# Patient Record
Sex: FEMALE | Race: WHITE | URBAN - METROPOLITAN AREA
[De-identification: names, ages, dates, MRNs, and addresses within clinical notes are randomized per-mention and may not be internally consistent; named-entity substitution may affect disease eponyms.]

---

## 2017-03-21 RX ORDER — TRAZODONE HYDROCHLORIDE 50 MG/1
TABLET ORAL
Qty: 90 TAB | Refills: 0 | Status: SHIPPED | OUTPATIENT
Start: 2017-03-21 | End: 2017-06-12 | Stop reason: SDUPTHER

## 2017-03-23 ENCOUNTER — HOSPITAL ENCOUNTER (OUTPATIENT)
Dept: LAB | Age: 57
Discharge: HOME | End: 2017-03-23
Payer: COMMERCIAL

## 2017-03-23 ENCOUNTER — OFFICE VISIT (OUTPATIENT)
Dept: FAMILY MEDICINE CLINIC | Facility: CLINIC | Age: 57
End: 2017-03-23

## 2017-03-23 VITALS
WEIGHT: 160 LBS | HEIGHT: 65 IN | OXYGEN SATURATION: 98 % | SYSTOLIC BLOOD PRESSURE: 110 MMHG | TEMPERATURE: 97.9 F | DIASTOLIC BLOOD PRESSURE: 80 MMHG | BODY MASS INDEX: 26.66 KG/M2 | RESPIRATION RATE: 15 BRPM | HEART RATE: 66 BPM

## 2017-03-23 DIAGNOSIS — L82.1 SEBORRHEIC KERATOSES: ICD-10-CM

## 2017-03-23 DIAGNOSIS — I10 ESSENTIAL HYPERTENSION: Primary | ICD-10-CM

## 2017-03-23 DIAGNOSIS — F32.A DEPRESSION, UNSPECIFIED DEPRESSION TYPE: ICD-10-CM

## 2017-03-23 DIAGNOSIS — I10 ESSENTIAL HYPERTENSION: ICD-10-CM

## 2017-03-23 DIAGNOSIS — Z13.9 SCREENING: ICD-10-CM

## 2017-03-23 DIAGNOSIS — J30.9 CHRONIC ALLERGIC RHINITIS: ICD-10-CM

## 2017-03-23 DIAGNOSIS — E78.5 HYPERLIPIDEMIA, UNSPECIFIED HYPERLIPIDEMIA TYPE: ICD-10-CM

## 2017-03-23 LAB
ALBUMIN SERPL BCP-MCNC: 4.3 G/DL (ref 3.4–5)
ALBUMIN/GLOB SERPL: 1.4 {RATIO} (ref 0.8–1.7)
ALP SERPL-CCNC: 72 U/L (ref 45–117)
ALT SERPL-CCNC: 38 U/L (ref 13–56)
ANION GAP BLD CALC-SCNC: 9 MMOL/L (ref 3–18)
APPEARANCE UR: CLEAR
AST SERPL W P-5'-P-CCNC: 28 U/L (ref 15–37)
BASOPHILS # BLD AUTO: 0 K/UL (ref 0–0.06)
BASOPHILS # BLD: 1 % (ref 0–2)
BILIRUB SERPL-MCNC: 0.6 MG/DL (ref 0.2–1)
BILIRUB UR QL: NEGATIVE
BUN SERPL-MCNC: 12 MG/DL (ref 7–18)
BUN/CREAT SERPL: 15 (ref 12–20)
CALCIUM SERPL-MCNC: 9.3 MG/DL (ref 8.5–10.1)
CHLORIDE SERPL-SCNC: 103 MMOL/L (ref 100–108)
CHOLEST SERPL-MCNC: 240 MG/DL
CO2 SERPL-SCNC: 29 MMOL/L (ref 21–32)
COLOR UR: YELLOW
CREAT SERPL-MCNC: 0.8 MG/DL (ref 0.6–1.3)
DIFFERENTIAL METHOD BLD: ABNORMAL
EOSINOPHIL # BLD: 0.2 K/UL (ref 0–0.4)
EOSINOPHIL NFR BLD: 2 % (ref 0–5)
ERYTHROCYTE [DISTWIDTH] IN BLOOD BY AUTOMATED COUNT: 12.5 % (ref 11.6–14.5)
GLOBULIN SER CALC-MCNC: 3 G/DL (ref 2–4)
GLUCOSE SERPL-MCNC: 86 MG/DL (ref 74–99)
GLUCOSE UR STRIP.AUTO-MCNC: NEGATIVE MG/DL
HCT VFR BLD AUTO: 44.7 % (ref 35–45)
HDLC SERPL-MCNC: 73 MG/DL (ref 40–60)
HDLC SERPL: 3.3 {RATIO} (ref 0–5)
HGB BLD-MCNC: 14.7 G/DL (ref 12–16)
HGB UR QL STRIP: NEGATIVE
KETONES UR QL STRIP.AUTO: NEGATIVE MG/DL
LDLC SERPL CALC-MCNC: 139.6 MG/DL (ref 0–100)
LEUKOCYTE ESTERASE UR QL STRIP.AUTO: NEGATIVE
LIPID PROFILE,FLP: ABNORMAL
LYMPHOCYTES # BLD AUTO: 40 % (ref 21–52)
LYMPHOCYTES # BLD: 2.8 K/UL (ref 0.9–3.6)
MCH RBC QN AUTO: 32.2 PG (ref 24–34)
MCHC RBC AUTO-ENTMCNC: 32.9 G/DL (ref 31–37)
MCV RBC AUTO: 97.8 FL (ref 74–97)
MONOCYTES # BLD: 0.6 K/UL (ref 0.05–1.2)
MONOCYTES NFR BLD AUTO: 9 % (ref 3–10)
NEUTS SEG # BLD: 3.3 K/UL (ref 1.8–8)
NEUTS SEG NFR BLD AUTO: 48 % (ref 40–73)
NITRITE UR QL STRIP.AUTO: NEGATIVE
PH UR STRIP: 7.5 [PH] (ref 5–8)
PLATELET # BLD AUTO: 224 K/UL (ref 135–420)
PMV BLD AUTO: 10.3 FL (ref 9.2–11.8)
POTASSIUM SERPL-SCNC: 4.2 MMOL/L (ref 3.5–5.5)
PROT SERPL-MCNC: 7.3 G/DL (ref 6.4–8.2)
PROT UR STRIP-MCNC: NEGATIVE MG/DL
RBC # BLD AUTO: 4.57 M/UL (ref 4.2–5.3)
SODIUM SERPL-SCNC: 141 MMOL/L (ref 136–145)
SP GR UR REFRACTOMETRY: 1.02 (ref 1–1.03)
T4 FREE SERPL-MCNC: 1.1 NG/DL (ref 0.7–1.5)
TRIGL SERPL-MCNC: 137 MG/DL (ref ?–150)
TSH SERPL DL<=0.05 MIU/L-ACNC: 1.58 UIU/ML (ref 0.36–3.74)
UROBILINOGEN UR QL STRIP.AUTO: 0.2 EU/DL (ref 0.2–1)
VLDLC SERPL CALC-MCNC: 27.4 MG/DL
WBC # BLD AUTO: 6.9 K/UL (ref 4.6–13.2)

## 2017-03-23 PROCEDURE — 36415 COLL VENOUS BLD VENIPUNCTURE: CPT | Performed by: FAMILY MEDICINE

## 2017-03-23 PROCEDURE — 84439 ASSAY OF FREE THYROXINE: CPT | Performed by: FAMILY MEDICINE

## 2017-03-23 PROCEDURE — 81003 URINALYSIS AUTO W/O SCOPE: CPT | Performed by: FAMILY MEDICINE

## 2017-03-23 PROCEDURE — 80061 LIPID PANEL: CPT | Performed by: FAMILY MEDICINE

## 2017-03-23 PROCEDURE — 80053 COMPREHEN METABOLIC PANEL: CPT | Performed by: FAMILY MEDICINE

## 2017-03-23 PROCEDURE — 85025 COMPLETE CBC W/AUTO DIFF WBC: CPT | Performed by: FAMILY MEDICINE

## 2017-03-23 PROCEDURE — 82306 VITAMIN D 25 HYDROXY: CPT | Performed by: FAMILY MEDICINE

## 2017-03-23 PROCEDURE — 84443 ASSAY THYROID STIM HORMONE: CPT | Performed by: FAMILY MEDICINE

## 2017-03-23 NOTE — PROGRESS NOTES
Nikolai Lechuga is a 55 y.o.  female presents today for office visit for follow up. Pt is in Room # 4      1. Have you been to the ER, urgent care clinic since your last visit? Hospitalized since your last visit? No    2. Have you seen or consulted any other health care providers outside of the [address] [place] [address] since your last visit? Include any pap smears or colon screening. No    No Patient Care Coordination Note on file.

## 2017-03-23 NOTE — PROGRESS NOTES
SUBJECTIVE  Chief Complaint   Patient presents with    Hypertension    Cholesterol Problem    Anxiety     HPI:     Her BP is being treated. Her depression/ anxiety is controlled. Congestion in both ears is controlled with Rhinocort w/o side effects. She is dieting for lipids. The skin lesion on Lt temple has not grown in the last 5-6 months. No pain or itching. Past Medical History:   Diagnosis Date    Acne     Breast nodule     right    Cervicalgia     Depression     Elevated LDL cholesterol level     Hypertension 1-1-2010    Multiple nevi     on skin    Uterine fibroid     with menorrhagia     Past Surgical History:   Procedure Laterality Date    HX TONSIL AND ADENOIDECTOMY         Current Outpatient Prescriptions   Medication Sig    traZODone (DESYREL) 50 mg tablet TAKE ONE-HALF (1/2) TO ONE TABLET AT BEDTIME    lisinopril-hydrochlorothiazide (PRINZIDE, ZESTORETIC) 20-12.5 mg per tablet TAKE 1 TABLET BY MOUTH EVERY DAY    budesonide (RHINOCORT AQUA) 32 mcg/actuation nasal spray INSTILL 2 SPRAYS INTO EACH NOSTRIL EVERY NIGHT AT BEDTIME    aspirin (ASPIRIN) 325 mg tablet Take 325 mg by mouth two (2) times daily as needed.  multivitamin (ONE A DAY) tablet Take 1 Tab by mouth daily. No current facility-administered medications for this visit. Allergies: No known allergy to drugs. ROS: Constitutional: No fever, dizziness. Ear/Nose/Throat: ear congestion as above; no earache, lesions, new speaking or hearing problems, nose bleeds. Cardiovascular: No angina, palpitations, PND, orthopnea, lightheadedness, edema, claudication. Respiratory: No dyspnea, wheeze, pleurisy, hemoptysis, unusual sputum. Gastrointestinal: No nausea/ vomiting, bowel habit change, pain, PAU symptoms, melena, hematochezia, anorexia. Psychiatric: No agitation, confusion/disorientation, suicidal or homicidal ideation.   Musculoskeletal: No focal weakness, stiffness/rigidity, radicular pain.  Skin: no other problems. OBJECTIVE  Constitutional: General Appearance:  well developed, well nourished, nontoxic, in no acute distress. Visit Vitals    /80 (BP 1 Location: Left arm, BP Patient Position: Sitting)    Pulse 66    Temp 97.9 °F (36.6 °C) (Oral)    Resp 15    Ht 5' 5\" (1.651 m)    Wt 160 lb (72.6 kg)    SpO2 98%    BMI 26.63 kg/m2     Otoscopic Examination: external auditory canals are clear; tympanic membranes are dull. Nasal Cavity: mildly swollen mucosa & turbinates. Maxillas are not tender w/o redness or heat. Throat: clear tonsils, oropharynx, posterior pharynx. Pulmonary: Respiratory effort: normal; no dyspnea, no retractions, no accessory muscle use. Auscultation: normal & symmetrical air exchange; no rales, no rhonchi, no wheeze; no rubs    Cardiovascular: Palpation: PMI not displaced or enlarged, no thrills or heaves. Auscultation: RRR; no murmur, rubs or gallops. Extremities: no edema, no active varicosity. Gastrointestinal: Normal bowel sounds. No masses; no tenderness; no rebound/rigidity; no CVA tenderness. No hepatosplenomegaly. Psychiatric: Oriented to time, place and person. Musculoskeletal[de-identified] Gait and Station: gait- normal; station- normal.  Head & Neck: NC/AT, neck is supple. Skin: 6 mm seborrheic keratosis on Lt temple (scaly, well-circumscribed lesion that have a \"stuck on\" appearance). She has few smaller ones on her neck area.       Lab Results   Component Value Date/Time    WBC 6.5 05/24/2016 09:33 AM    HGB 14.4 05/24/2016 09:33 AM    HCT 42.8 05/24/2016 09:33 AM    PLATELET 602 90/07/3011 09:33 AM    MCV 97.9 05/24/2016 09:33 AM     Lab Results   Component Value Date/Time    Cholesterol, total 254 09/30/2016 08:55 AM    HDL Cholesterol 72 09/30/2016 08:55 AM    LDL, calculated 136 09/30/2016 08:55 AM    VLDL, calculated 46 09/30/2016 08:55 AM    Triglyceride 230 09/30/2016 08:55 AM    CHOL/HDL Ratio 3.5 09/30/2016 08:55 AM Lab Results   Component Value Date/Time    Sodium 139 05/24/2016 09:33 AM    Potassium 3.6 05/24/2016 09:33 AM    Chloride 100 05/24/2016 09:33 AM    CO2 29 05/24/2016 09:33 AM    Anion gap 10 05/24/2016 09:33 AM    Glucose 96 05/24/2016 09:33 AM    BUN 9 05/24/2016 09:33 AM    Creatinine 0.81 05/24/2016 09:33 AM    BUN/Creatinine ratio 11 05/24/2016 09:33 AM    GFR est AA >60 05/24/2016 09:33 AM    GFR est non-AA >60 05/24/2016 09:33 AM    Calcium 9.3 05/24/2016 09:33 AM    Bilirubin, total 0.7 05/24/2016 09:33 AM    AST (SGOT) 25 05/24/2016 09:33 AM    Alk. phosphatase 62 05/24/2016 09:33 AM    Protein, total 7.3 05/24/2016 09:33 AM    Albumin 4.3 05/24/2016 09:33 AM    Globulin 3.0 05/24/2016 09:33 AM    A-G Ratio 1.4 05/24/2016 09:33 AM    ALT (SGPT) 31 05/24/2016 09:33 AM     Lab Results   Component Value Date/Time    TSH 1.50 05/24/2016 09:33 AM       ASSESSMENT    1. Essential hypertension    2. Depression, unspecified depression type    3. Hyperlipidemia, unspecified hyperlipidemia type    4. Screening    5. Seborrheic keratoses    6. Chronic allergic rhinitis        PLAN    Non-Pharmacologic Management: Discussed DDx, follow-up & work-up. Discussed risk/benefit & side effect of treatment. Allergy care. Skin care and f/u discussed. Follow up for regular OV, prn sooner. Health risk from non adherence discussed. Patient voiced understanding. Follow-up Disposition:  Return in about 3 months (around 6/23/2017).      Grabiel Galicia MD

## 2017-03-24 ENCOUNTER — TELEPHONE (OUTPATIENT)
Dept: FAMILY MEDICINE CLINIC | Facility: CLINIC | Age: 57
End: 2017-03-24

## 2017-03-24 LAB — 25(OH)D3 SERPL-MCNC: 36.1 NG/ML (ref 30–100)

## 2017-03-24 NOTE — TELEPHONE ENCOUNTER
LDL cholesterol is a little high; unchanged.  The patient is advised to continue with diet and exercise. Called pt and left message. Call back number left and I myself or one of the other nurses will attempt to contact again.     The call was to inform pt results

## 2017-03-24 NOTE — PROGRESS NOTES
LDL cholesterol is a little high; unchanged. The patient is advised to continue with diet and exercise.

## 2017-06-12 RX ORDER — TRAZODONE HYDROCHLORIDE 50 MG/1
TABLET ORAL
Qty: 90 TAB | Refills: 0 | Status: SHIPPED | OUTPATIENT
Start: 2017-06-12 | End: 2017-09-08 | Stop reason: SDUPTHER

## 2017-06-27 ENCOUNTER — OFFICE VISIT (OUTPATIENT)
Dept: FAMILY MEDICINE CLINIC | Facility: CLINIC | Age: 57
End: 2017-06-27

## 2017-06-27 VITALS
DIASTOLIC BLOOD PRESSURE: 84 MMHG | BODY MASS INDEX: 26.66 KG/M2 | RESPIRATION RATE: 16 BRPM | SYSTOLIC BLOOD PRESSURE: 124 MMHG | HEIGHT: 65 IN | WEIGHT: 160 LBS | HEART RATE: 88 BPM | TEMPERATURE: 98.5 F | OXYGEN SATURATION: 96 %

## 2017-06-27 DIAGNOSIS — I10 ESSENTIAL HYPERTENSION: Primary | ICD-10-CM

## 2017-06-27 DIAGNOSIS — F32.A DEPRESSION, UNSPECIFIED DEPRESSION TYPE: ICD-10-CM

## 2017-06-27 DIAGNOSIS — H69.83 EUSTACHIAN TUBE DYSFUNCTION, BILATERAL: ICD-10-CM

## 2017-06-27 DIAGNOSIS — E78.5 HYPERLIPIDEMIA, UNSPECIFIED HYPERLIPIDEMIA TYPE: ICD-10-CM

## 2017-06-27 NOTE — PROGRESS NOTES
SUBJECTIVE  Chief Complaint   Patient presents with    Hypertension     f/u     HPI:     Her BP is being treated. Her depression/ anxiety is controlled. Congestion in both ears is controlled with Rhinocort w/o side effects. She is dieting for lipids. The skin lesion on Lt temple has not grown in the last 5-6 months. No pain or itching. Past Medical History:   Diagnosis Date    Acne     Breast nodule     right    Cervicalgia     Depression     Elevated LDL cholesterol level     Hypertension 1-1-2010    Multiple nevi     on skin    Uterine fibroid     with menorrhagia     Past Surgical History:   Procedure Laterality Date    HX TONSIL AND ADENOIDECTOMY         Current Outpatient Prescriptions   Medication Sig    traZODone (DESYREL) 50 mg tablet TAKE ONE-HALF (1/2) TO ONE TABLET AT BEDTIME    lisinopril-hydrochlorothiazide (PRINZIDE, ZESTORETIC) 20-12.5 mg per tablet TAKE 1 TABLET BY MOUTH EVERY DAY    budesonide (RHINOCORT AQUA) 32 mcg/actuation nasal spray INSTILL 2 SPRAYS INTO EACH NOSTRIL EVERY NIGHT AT BEDTIME    aspirin (ASPIRIN) 325 mg tablet Take 325 mg by mouth two (2) times daily as needed.  multivitamin (ONE A DAY) tablet Take 1 Tab by mouth daily. No current facility-administered medications for this visit. Allergies: No known allergy to drugs. ROS:   Constitutional: No fever, dizziness. Ear/Nose/Throat: ear congestion as above; no earache, lesions, new speaking or hearing problems, nose bleeds. Cardiovascular: No angina, palpitations, PND, orthopnea, lightheadedness, edema, claudication. Respiratory: No dyspnea, wheeze, pleurisy, hemoptysis, unusual sputum. Gastrointestinal: No nausea/ vomiting, bowel habit change, pain, PAU symptoms, melena, hematochezia, anorexia. Psychiatric: No agitation, confusion/disorientation, suicidal or homicidal ideation. Musculoskeletal: No focal weakness, stiffness/rigidity, radicular pain.   Skin: no other problems. OBJECTIVE  Constitutional: General Appearance:  well developed, well nourished, nontoxic, in no acute distress. Visit Vitals    /84 (BP 1 Location: Left arm, BP Patient Position: Sitting)    Pulse 88    Temp 98.5 °F (36.9 °C) (Oral)    Resp 16    Ht 5' 5\" (1.651 m)    Wt 160 lb (72.6 kg)    SpO2 96%    BMI 26.63 kg/m2     Otoscopic Examination: external auditory canals are clear; tympanic membranes are dull. Nasal Cavity: mildly swollen mucosa & turbinates. Maxillas are not tender w/o redness or heat. Throat: clear tonsils, oropharynx, posterior pharynx. Pulmonary: Respiratory effort: normal; no dyspnea, no retractions, no accessory muscle use. Auscultation: normal & symmetrical air exchange; no rales, no rhonchi, no wheeze; no rubs    Cardiovascular: Palpation: PMI not displaced or enlarged, no thrills or heaves. Auscultation: RRR; no murmur, rubs or gallops. Extremities: no edema, no active varicosity. Gastrointestinal: Normal bowel sounds. No masses; no tenderness; no rebound/rigidity; no CVA tenderness. No hepatosplenomegaly. Psychiatric: Oriented to time, place and person. Musculoskeletal[de-identified] Gait and Station: gait- normal; station- normal.  Head & Neck: NC/AT, neck is supple.        Lab Results   Component Value Date/Time    WBC 6.9 03/23/2017 08:35 AM    HGB 14.7 03/23/2017 08:35 AM    HCT 44.7 03/23/2017 08:35 AM    PLATELET 305 74/31/8915 08:35 AM    MCV 97.8 03/23/2017 08:35 AM     Lab Results   Component Value Date/Time    Cholesterol, total 240 03/23/2017 08:35 AM    HDL Cholesterol 73 03/23/2017 08:35 AM    LDL, calculated 139.6 03/23/2017 08:35 AM    VLDL, calculated 27.4 03/23/2017 08:35 AM    Triglyceride 137 03/23/2017 08:35 AM    CHOL/HDL Ratio 3.3 03/23/2017 08:35 AM     Lab Results   Component Value Date/Time    Sodium 141 03/23/2017 08:35 AM    Potassium 4.2 03/23/2017 08:35 AM    Chloride 103 03/23/2017 08:35 AM    CO2 29 03/23/2017 08:35 AM Anion gap 9 03/23/2017 08:35 AM    Glucose 86 03/23/2017 08:35 AM    BUN 12 03/23/2017 08:35 AM    Creatinine 0.80 03/23/2017 08:35 AM    BUN/Creatinine ratio 15 03/23/2017 08:35 AM    GFR est AA >60 03/23/2017 08:35 AM    GFR est non-AA >60 03/23/2017 08:35 AM    Calcium 9.3 03/23/2017 08:35 AM    Bilirubin, total 0.6 03/23/2017 08:35 AM    AST (SGOT) 28 03/23/2017 08:35 AM    Alk. phosphatase 72 03/23/2017 08:35 AM    Protein, total 7.3 03/23/2017 08:35 AM    Albumin 4.3 03/23/2017 08:35 AM    Globulin 3.0 03/23/2017 08:35 AM    A-G Ratio 1.4 03/23/2017 08:35 AM    ALT (SGPT) 38 03/23/2017 08:35 AM     Lab Results   Component Value Date/Time    TSH 1.58 03/23/2017 08:35 AM     10 year CVD risk 2.3%. ASSESSMENT    1. Essential hypertension    2. Depression, unspecified depression type    3. Hyperlipidemia, unspecified hyperlipidemia type    4. Eustachian tube dysfunction, bilateral        PLAN    Non-Pharmacologic Management: Discussed DDx, follow-up & work-up. Discussed risk/benefit & side effect of treatment. Allergy care. Follow up for regular OV, prn sooner. Health risk from non adherence discussed. Patient voiced understanding. Follow-up Disposition:  Return in about 3 months (around 9/27/2017).      González Barnett MD

## 2017-06-27 NOTE — PROGRESS NOTES
1. Have you been to the ER, urgent care clinic or hospitalized since your last visit? NO.     2. Have you seen or consulted any other health care providers outside of the [address] [place] [address] since your last visit (Include any pap smears or colon screening)?  NO

## 2017-09-08 RX ORDER — TRAZODONE HYDROCHLORIDE 50 MG/1
TABLET ORAL
Qty: 90 TAB | Refills: 1 | Status: SHIPPED | OUTPATIENT
Start: 2017-09-08 | End: 2018-02-06 | Stop reason: SDUPTHER

## 2017-10-17 RX ORDER — LISINOPRIL AND HYDROCHLOROTHIAZIDE 12.5; 2 MG/1; MG/1
TABLET ORAL
Qty: 90 TAB | Refills: 3 | Status: SHIPPED | OUTPATIENT
Start: 2017-10-17 | End: 2018-05-10 | Stop reason: SDUPTHER

## 2017-10-19 ENCOUNTER — OFFICE VISIT (OUTPATIENT)
Dept: FAMILY MEDICINE CLINIC | Facility: CLINIC | Age: 57
End: 2017-10-19

## 2017-10-19 ENCOUNTER — HOSPITAL ENCOUNTER (OUTPATIENT)
Dept: LAB | Age: 57
Discharge: HOME | End: 2017-10-19
Payer: COMMERCIAL

## 2017-10-19 VITALS
WEIGHT: 159 LBS | HEIGHT: 65 IN | TEMPERATURE: 98.6 F | SYSTOLIC BLOOD PRESSURE: 120 MMHG | HEART RATE: 52 BPM | DIASTOLIC BLOOD PRESSURE: 80 MMHG | OXYGEN SATURATION: 100 % | BODY MASS INDEX: 26.49 KG/M2

## 2017-10-19 DIAGNOSIS — E78.5 HYPERLIPIDEMIA, UNSPECIFIED HYPERLIPIDEMIA TYPE: ICD-10-CM

## 2017-10-19 DIAGNOSIS — I10 ESSENTIAL HYPERTENSION: ICD-10-CM

## 2017-10-19 DIAGNOSIS — F32.A DEPRESSION, UNSPECIFIED DEPRESSION TYPE: ICD-10-CM

## 2017-10-19 DIAGNOSIS — G44.219 EPISODIC TENSION-TYPE HEADACHE, NOT INTRACTABLE: ICD-10-CM

## 2017-10-19 DIAGNOSIS — I10 ESSENTIAL HYPERTENSION: Primary | ICD-10-CM

## 2017-10-19 LAB
ANION GAP SERPL CALC-SCNC: 8 MMOL/L (ref 3–18)
BUN SERPL-MCNC: 14 MG/DL (ref 7–18)
BUN/CREAT SERPL: 19 (ref 12–20)
CALCIUM SERPL-MCNC: 9.6 MG/DL (ref 8.5–10.1)
CHLORIDE SERPL-SCNC: 103 MMOL/L (ref 100–108)
CHOLEST SERPL-MCNC: 232 MG/DL
CO2 SERPL-SCNC: 29 MMOL/L (ref 21–32)
CREAT SERPL-MCNC: 0.75 MG/DL (ref 0.6–1.3)
GLUCOSE SERPL-MCNC: 87 MG/DL (ref 74–99)
HDLC SERPL-MCNC: 63 MG/DL (ref 40–60)
HDLC SERPL: 3.7 {RATIO} (ref 0–5)
LDLC SERPL CALC-MCNC: 132.8 MG/DL (ref 0–100)
LIPID PROFILE,FLP: ABNORMAL
POTASSIUM SERPL-SCNC: 4 MMOL/L (ref 3.5–5.5)
SODIUM SERPL-SCNC: 140 MMOL/L (ref 136–145)
TRIGL SERPL-MCNC: 181 MG/DL (ref ?–150)
VLDLC SERPL CALC-MCNC: 36.2 MG/DL

## 2017-10-19 PROCEDURE — 80048 BASIC METABOLIC PNL TOTAL CA: CPT | Performed by: FAMILY MEDICINE

## 2017-10-19 PROCEDURE — 36415 COLL VENOUS BLD VENIPUNCTURE: CPT | Performed by: FAMILY MEDICINE

## 2017-10-19 PROCEDURE — 80061 LIPID PANEL: CPT | Performed by: FAMILY MEDICINE

## 2017-10-19 NOTE — PROGRESS NOTES
Jamal Newberry is a 55 y.o.  female presents today for office visit for follow up. Pt is in Room # 4      1. Have you been to the ER, urgent care clinic since your last visit? Hospitalized since your last visit? No    2. Have you seen or consulted any other health care providers outside of the [address] [place] [address] since your last visit? Include any pap smears or colon screening. No    Health Maintenance reviewed - patient received flu vx at [facility].

## 2017-10-19 NOTE — PROGRESS NOTES
SUBJECTIVE  Chief Complaint   Patient presents with    Hypertension    Cholesterol Problem    Depression     HPI:     Her BP is being treated and controlled. Her depression/ anxiety is controlled. Congestion in both ears is controlled with Rhinocort w/o side effects. She is dieting for lipids. She has had HA for one week after hitting the back of her head on furniture 2 weeks ago. No LOC or confusion. No focal signs. No N&V. The skin lesion on Lt temple has not grown in the last 5-6 months. No pain or itching. Past Medical History:   Diagnosis Date    Acne     Breast nodule     right    Cervicalgia     Depression     Elevated LDL cholesterol level     Hypertension 1-1-2010    Multiple nevi     on skin    Uterine fibroid     with menorrhagia     Past Surgical History:   Procedure Laterality Date    HX TONSIL AND ADENOIDECTOMY         Current Outpatient Prescriptions   Medication Sig    lisinopril-hydroCHLOROthiazide (PRINZIDE, ZESTORETIC) 20-12.5 mg per tablet TAKE 1 TABLET DAILY    traZODone (DESYREL) 50 mg tablet TAKE ONE-HALF (1/2) TO ONE TABLET AT BEDTIME    budesonide (RHINOCORT AQUA) 32 mcg/actuation nasal spray INSTILL 2 SPRAYS INTO EACH NOSTRIL EVERY NIGHT AT BEDTIME    aspirin (ASPIRIN) 325 mg tablet Take 325 mg by mouth two (2) times daily as needed.  multivitamin (ONE A DAY) tablet Take 1 Tab by mouth daily. No current facility-administered medications for this visit. Allergies: No known allergy to drugs. ROS:   Constitutional: No fever, chills, night sweats, malaise, dizziness. Ear/Nose/Throat: ear congestion as above; no earache, lesions, new speaking or hearing problems, nose bleeds. Cardiovascular: No angina, palpitations, PND, orthopnea, lightheadedness, edema, claudication. Respiratory: No dyspnea, wheeze, pleurisy, hemoptysis, unusual sputum.     Gastrointestinal: No nausea/ vomiting, bowel habit change, pain, PAU symptoms, melena, hematochezia, anorexia. Psychiatric: No agitation, confusion/disorientation, suicidal or homicidal ideation. Musculoskeletal: No focal weakness, stiffness/rigidity, radicular pain. Skin: no other problems. OBJECTIVE  Constitutional: General Appearance:  well developed, well nourished, nontoxic, in no acute distress. Visit Vitals    /80 (BP 1 Location: Left arm, BP Patient Position: Sitting)    Pulse (!) 52    Temp 98.6 °F (37 °C) (Oral)    Ht 5' 5\" (1.651 m)    Wt 159 lb (72.1 kg)    SpO2 100%    BMI 26.46 kg/m2     Eyes[de-identified] Conjunctiva: normal & Lids: normal.  Pupils & Irises: normal size; equal, round and reactive to light. Otoscopic Examination: external auditory canals are clear; tympanic membranes are dull. Nasal Cavity: mildly swollen mucosa & turbinates. Maxillas are not tender w/o redness or heat. Throat: clear tonsils, oropharynx, posterior pharynx. Pulmonary: Respiratory effort: normal; no dyspnea, no retractions, no accessory muscle use. Auscultation: normal & symmetrical air exchange; no rales, no rhonchi, no wheeze; no rubs    Cardiovascular: Palpation: PMI not displaced or enlarged, no thrills or heaves. Auscultation: RRR; no murmur, rubs or gallops. Extremities: no edema, no active varicosity. Gastrointestinal: Normal bowel sounds. No masses; no tenderness; no rebound/rigidity; no CVA tenderness. No hepatosplenomegaly. Psychiatric: Oriented to time, place and person. Musculoskeletal[de-identified]   Gait and Station: gait- normal; station- normal.  Head & Neck: NC/AT, neck is supple. Neck: good ROM. Non tender. All Ext:  Normal strength & tone. Neurological[de-identified] CN I to XII: intact.   DTR: symmetrical & normal.  Balance- normal. Sensory- normal.      Lab Results   Component Value Date/Time    WBC 6.9 03/23/2017 08:35 AM    HGB 14.7 03/23/2017 08:35 AM    HCT 44.7 03/23/2017 08:35 AM    PLATELET 866 38/43/8490 08:35 AM    MCV 97.8 03/23/2017 08:35 AM     Lab Results   Component Value Date/Time    Cholesterol, total 240 03/23/2017 08:35 AM    HDL Cholesterol 73 03/23/2017 08:35 AM    LDL, calculated 139.6 03/23/2017 08:35 AM    VLDL, calculated 27.4 03/23/2017 08:35 AM    Triglyceride 137 03/23/2017 08:35 AM    CHOL/HDL Ratio 3.3 03/23/2017 08:35 AM     Lab Results   Component Value Date/Time    Sodium 141 03/23/2017 08:35 AM    Potassium 4.2 03/23/2017 08:35 AM    Chloride 103 03/23/2017 08:35 AM    CO2 29 03/23/2017 08:35 AM    Anion gap 9 03/23/2017 08:35 AM    Glucose 86 03/23/2017 08:35 AM    BUN 12 03/23/2017 08:35 AM    Creatinine 0.80 03/23/2017 08:35 AM    BUN/Creatinine ratio 15 03/23/2017 08:35 AM    GFR est AA >60 03/23/2017 08:35 AM    GFR est non-AA >60 03/23/2017 08:35 AM    Calcium 9.3 03/23/2017 08:35 AM    Bilirubin, total 0.6 03/23/2017 08:35 AM    AST (SGOT) 28 03/23/2017 08:35 AM    Alk. phosphatase 72 03/23/2017 08:35 AM    Protein, total 7.3 03/23/2017 08:35 AM    Albumin 4.3 03/23/2017 08:35 AM    Globulin 3.0 03/23/2017 08:35 AM    A-G Ratio 1.4 03/23/2017 08:35 AM    ALT (SGPT) 38 03/23/2017 08:35 AM     Lab Results   Component Value Date/Time    TSH 1.58 03/23/2017 08:35 AM     10 year CVD risk 2.3%. ASSESSMENT    1. Essential hypertension    2. Depression, unspecified depression type    3. Hyperlipidemia, unspecified hyperlipidemia type    4. Episodic tension-type headache, not intractable, resolved        PLAN    Orders Placed This Encounter    LIPID PANEL    METABOLIC PANEL, BASIC    HEMOGLOBIN A1C WITH EAG     Discussed FU of head injury. Discussed DDx, follow-up & work-up. Discussed risk/benefit & side effect of treatment. Allergy care. Follow up for regular OV, prn sooner. Health risk from non adherence discussed. Patient voiced understanding. Follow-up Disposition:  Return in about 3 months (around 1/19/2018).      Tia Grissom MD

## 2017-10-20 ENCOUNTER — TELEPHONE (OUTPATIENT)
Dept: FAMILY MEDICINE CLINIC | Facility: CLINIC | Age: 57
End: 2017-10-20

## 2017-10-20 NOTE — PROGRESS NOTES
Triglyceride was high again. Lipid profile is about same as last 18 months. The patient is advised to continue with diet and exercise. BMP was good.

## 2017-10-20 NOTE — TELEPHONE ENCOUNTER
Spoke with pt in regards to lab results. Two pt identifier's and permission to release verified. Relayed 's notes. Pt acknowledges understanding and states will go to [facility] to have A1C performed. Pt voices no concerns at this time.

## 2018-02-06 ENCOUNTER — OFFICE VISIT (OUTPATIENT)
Dept: FAMILY MEDICINE CLINIC | Age: 58
End: 2018-02-06

## 2018-02-06 VITALS
HEIGHT: 65 IN | BODY MASS INDEX: 26.66 KG/M2 | TEMPERATURE: 98.6 F | HEART RATE: 56 BPM | SYSTOLIC BLOOD PRESSURE: 128 MMHG | WEIGHT: 160 LBS | OXYGEN SATURATION: 97 % | DIASTOLIC BLOOD PRESSURE: 84 MMHG | RESPIRATION RATE: 18 BRPM

## 2018-02-06 DIAGNOSIS — I10 ESSENTIAL HYPERTENSION: Primary | ICD-10-CM

## 2018-02-06 DIAGNOSIS — F32.A DEPRESSION, UNSPECIFIED DEPRESSION TYPE: ICD-10-CM

## 2018-02-06 DIAGNOSIS — E78.5 HYPERLIPIDEMIA, UNSPECIFIED HYPERLIPIDEMIA TYPE: ICD-10-CM

## 2018-02-06 DIAGNOSIS — E66.3 OVERWEIGHT (BMI 25.0-29.9): ICD-10-CM

## 2018-02-06 DIAGNOSIS — S60.221A TRAUMATIC HEMATOMA OF RIGHT HAND, INITIAL ENCOUNTER: ICD-10-CM

## 2018-02-06 RX ORDER — TRAZODONE HYDROCHLORIDE 50 MG/1
TABLET ORAL
Qty: 90 TAB | Refills: 3 | Status: SHIPPED | OUTPATIENT
Start: 2018-02-06 | End: 2018-12-13 | Stop reason: SDUPTHER

## 2018-02-06 NOTE — PROGRESS NOTES
Gilmar Galdamez is a 65 y.o. female presents in office to f/u. There are no preventive care reminders to display for this patient. 1. Have you been to the ER, urgent care clinic since your last visit? Hospitalized since your last visit? No    2. Have you seen or consulted any other health care providers outside of the [address] [place] [address] since your last visit? Include any pap smears or colon screening.  No

## 2018-02-06 NOTE — PROGRESS NOTES
SUBJECTIVE:    Chief Complaint   Patient presents with    Hypertension     f/u    Cholesterol Problem     f/u    Depression     f/u    Hand Injury     HPI:     Her BP is being treated and controlled. Her depression/ anxiety is controlled. Congestion in both ears is controlled with Rhinocort w/o side effects. She is dieting for lipids and weight. She has had pain in her Rt hand since banged hand into fireplace one week and a half ago and still has a tender bump. Bruising is fading. Past Medical History:   Diagnosis Date    Acne     Breast nodule     right    Cervicalgia     Depression     Elevated LDL cholesterol level     Hypertension 1-1-2010    Multiple nevi     on skin    Uterine fibroid     with menorrhagia     Past Surgical History:   Procedure Laterality Date    HX TONSIL AND ADENOIDECTOMY         Current Outpatient Prescriptions   Medication Sig    traZODone (DESYREL) 50 mg tablet TAKE ONE TABLET AT BEDTIME    lisinopril-hydroCHLOROthiazide (PRINZIDE, ZESTORETIC) 20-12.5 mg per tablet TAKE 1 TABLET DAILY    budesonide (RHINOCORT AQUA) 32 mcg/actuation nasal spray INSTILL 2 SPRAYS INTO EACH NOSTRIL EVERY NIGHT AT BEDTIME    aspirin (ASPIRIN) 325 mg tablet Take 325 mg by mouth two (2) times daily as needed.  multivitamin (ONE A DAY) tablet Take 1 Tab by mouth daily. No current facility-administered medications for this visit. Allergies: No known allergy to drugs. ROS:   Constitutional: No fever, chills, night sweats, malaise, dizziness. Ear/Nose/Throat: ear congestion as above; no earache, lesions, new speaking or hearing problems, nose bleeds. Cardiovascular: No angina, palpitations, PND, orthopnea, lightheadedness, edema, claudication. Respiratory: No dyspnea, wheeze, pleurisy, hemoptysis, unusual sputum. Gastrointestinal: No nausea/ vomiting, bowel habit change, pain, PAU symptoms, melena, hematochezia, anorexia.     Psychiatric: No agitation, confusion/disorientation, suicidal or homicidal ideation. Musculoskeletal: No focal weakness, stiffness/rigidity, radicular pain. Skin: no other problems. OBJECTIVE  Constitutional: General Appearance:  well developed, well nourished, nontoxic, in no acute distress. Visit Vitals    /84 (BP 1 Location: Left arm, BP Patient Position: Sitting)    Pulse (!) 56    Temp 98.6 °F (37 °C) (Oral)    Resp 18    Ht 5' 5\" (1.651 m)    Wt 160 lb (72.6 kg)    SpO2 97%    BMI 26.63 kg/m2     Eyes[de-identified] Conjunctiva: normal & Lids: normal.  Pupils & Irises: normal size; equal, round and reactive to light. Otoscopic Examination: external auditory canals are clear; tympanic membranes are dull. Nasal Cavity: mildly swollen mucosa & turbinates. Maxillas are not tender w/o redness or heat. Throat: clear tonsils, oropharynx, posterior pharynx. Pulmonary: Respiratory effort: normal; no dyspnea, no retractions, no accessory muscle use. Auscultation: normal & symmetrical air exchange; no rales, no rhonchi, no wheeze; no rubs    Cardiovascular: Palpation: PMI not displaced or enlarged, no thrills or heaves. Auscultation: RRR; no murmur, rubs or gallops. Extremities: no edema, no active varicosity. Gastrointestinal: Normal bowel sounds. No masses; no tenderness; no rebound/rigidity; no CVA tenderness. No hepatosplenomegaly. Psychiatric: Oriented to time, place and person. Musculoskeletal[de-identified]   All Ext:  Normal strength & tone. Rt hand: there is a <1 cm hematoma on back on Rt hand with fading ecchymosis. Normal function. No deformity. Good ROM WO pain. Neurological[de-identified] CN I to XII: intact.   DTR: symmetrical & normal.      Lab Results   Component Value Date/Time    WBC 6.9 03/23/2017 08:35 AM    HGB 14.7 03/23/2017 08:35 AM    HCT 44.7 03/23/2017 08:35 AM    PLATELET 573 40/76/7382 08:35 AM    MCV 97.8 03/23/2017 08:35 AM     Lab Results   Component Value Date/Time    Cholesterol, total 232 10/19/2017 08:14 AM    HDL Cholesterol 63 10/19/2017 08:14 AM    LDL, calculated 132.8 10/19/2017 08:14 AM    VLDL, calculated 36.2 10/19/2017 08:14 AM    Triglyceride 181 10/19/2017 08:14 AM    CHOL/HDL Ratio 3.7 10/19/2017 08:14 AM     Lab Results   Component Value Date/Time    Sodium 140 10/19/2017 08:14 AM    Potassium 4.0 10/19/2017 08:14 AM    Chloride 103 10/19/2017 08:14 AM    CO2 29 10/19/2017 08:14 AM    Anion gap 8 10/19/2017 08:14 AM    Glucose 87 10/19/2017 08:14 AM    BUN 14 10/19/2017 08:14 AM    Creatinine 0.75 10/19/2017 08:14 AM    BUN/Creatinine ratio 19 10/19/2017 08:14 AM    GFR est AA >60 10/19/2017 08:14 AM    GFR est non-AA >60 10/19/2017 08:14 AM    Calcium 9.6 10/19/2017 08:14 AM    Bilirubin, total 0.6 03/23/2017 08:35 AM    AST (SGOT) 28 03/23/2017 08:35 AM    Alk. phosphatase 72 03/23/2017 08:35 AM    Protein, total 7.3 03/23/2017 08:35 AM    Albumin 4.3 03/23/2017 08:35 AM    Globulin 3.0 03/23/2017 08:35 AM    A-G Ratio 1.4 03/23/2017 08:35 AM    ALT (SGPT) 38 03/23/2017 08:35 AM     Lab Results   Component Value Date/Time    TSH 1.58 03/23/2017 08:35 AM     10 year CVD risk @2.5%. ASSESSMENT    1. Essential hypertension    2. Hyperlipidemia, unspecified hyperlipidemia type    3. Depression, unspecified depression type    4. Traumatic hematoma of right hand, initial encounter    5. Overweight (BMI 25.0-29. 9)        PLAN    Orders Placed This Encounter    traZODone (DESYREL) 50 mg tablet     Discussed the patient's BMI with her. The BMI follow up plan is as follows:   -dietary management education, guidance, and counseling  -encourage exercise  -monitor weight prescribed dietary intake  -reviewed instructions. Gentle ROM exercises, moist heat and posture care. Discussed DDx, follow-up & work-up. Discussed risk/benefit & side effect of treatment. Lipid lowering low salt diet, weight loss and graduated exercise. Allergy care. Follow up for regular OV, prn sooner.   Health risk from non adherence discussed. Patient voiced understanding. Follow-up Disposition:  Return in about 3 months (around 5/6/2018).      Jorge Bradshaw MD

## 2018-05-10 ENCOUNTER — OFFICE VISIT (OUTPATIENT)
Dept: FAMILY MEDICINE CLINIC | Age: 58
End: 2018-05-10

## 2018-05-10 ENCOUNTER — HOSPITAL ENCOUNTER (OUTPATIENT)
Dept: LAB | Age: 58
Discharge: HOME | End: 2018-05-10
Payer: COMMERCIAL

## 2018-05-10 VITALS
HEART RATE: 56 BPM | WEIGHT: 156.4 LBS | BODY MASS INDEX: 26.06 KG/M2 | SYSTOLIC BLOOD PRESSURE: 110 MMHG | DIASTOLIC BLOOD PRESSURE: 80 MMHG | TEMPERATURE: 98 F | HEIGHT: 65 IN | RESPIRATION RATE: 18 BRPM | OXYGEN SATURATION: 98 %

## 2018-05-10 DIAGNOSIS — I10 ESSENTIAL HYPERTENSION: ICD-10-CM

## 2018-05-10 DIAGNOSIS — F33.9 RECURRENT DEPRESSION (HCC): ICD-10-CM

## 2018-05-10 DIAGNOSIS — E78.5 HYPERLIPIDEMIA, UNSPECIFIED HYPERLIPIDEMIA TYPE: ICD-10-CM

## 2018-05-10 DIAGNOSIS — E66.3 OVERWEIGHT (BMI 25.0-29.9): ICD-10-CM

## 2018-05-10 DIAGNOSIS — I10 ESSENTIAL HYPERTENSION: Primary | ICD-10-CM

## 2018-05-10 DIAGNOSIS — M72.2 PLANTAR FASCIITIS OF RIGHT FOOT: ICD-10-CM

## 2018-05-10 LAB
ALBUMIN SERPL-MCNC: 4.3 G/DL (ref 3.4–5)
ALBUMIN/GLOB SERPL: 1.5 {RATIO} (ref 0.8–1.7)
ALP SERPL-CCNC: 64 U/L (ref 45–117)
ALT SERPL-CCNC: 33 U/L (ref 13–56)
ANION GAP SERPL CALC-SCNC: 10 MMOL/L (ref 3–18)
APPEARANCE UR: CLEAR
AST SERPL-CCNC: 23 U/L (ref 15–37)
BASOPHILS # BLD: 0.1 K/UL (ref 0–0.06)
BASOPHILS NFR BLD: 1 % (ref 0–2)
BILIRUB SERPL-MCNC: 0.5 MG/DL (ref 0.2–1)
BILIRUB UR QL: NEGATIVE
BUN SERPL-MCNC: 11 MG/DL (ref 7–18)
BUN/CREAT SERPL: 13 (ref 12–20)
CALCIUM SERPL-MCNC: 9 MG/DL (ref 8.5–10.1)
CHLORIDE SERPL-SCNC: 104 MMOL/L (ref 100–108)
CHOLEST SERPL-MCNC: 201 MG/DL
CO2 SERPL-SCNC: 25 MMOL/L (ref 21–32)
COLOR UR: YELLOW
CREAT SERPL-MCNC: 0.82 MG/DL (ref 0.6–1.3)
DIFFERENTIAL METHOD BLD: ABNORMAL
EOSINOPHIL # BLD: 0.1 K/UL (ref 0–0.4)
EOSINOPHIL NFR BLD: 2 % (ref 0–5)
ERYTHROCYTE [DISTWIDTH] IN BLOOD BY AUTOMATED COUNT: 12 % (ref 11.6–14.5)
GLOBULIN SER CALC-MCNC: 2.9 G/DL (ref 2–4)
GLUCOSE SERPL-MCNC: 86 MG/DL (ref 74–99)
GLUCOSE UR STRIP.AUTO-MCNC: NEGATIVE MG/DL
HCT VFR BLD AUTO: 44.8 % (ref 35–45)
HDLC SERPL-MCNC: 51 MG/DL (ref 40–60)
HDLC SERPL: 3.9 {RATIO} (ref 0–5)
HGB BLD-MCNC: 14.9 G/DL (ref 12–16)
HGB UR QL STRIP: NEGATIVE
KETONES UR QL STRIP.AUTO: NEGATIVE MG/DL
LDLC SERPL CALC-MCNC: 124.8 MG/DL (ref 0–100)
LEUKOCYTE ESTERASE UR QL STRIP.AUTO: NEGATIVE
LIPID PROFILE,FLP: ABNORMAL
LYMPHOCYTES # BLD: 2.4 K/UL (ref 0.9–3.6)
LYMPHOCYTES NFR BLD: 37 % (ref 21–52)
MCH RBC QN AUTO: 32.5 PG (ref 24–34)
MCHC RBC AUTO-ENTMCNC: 33.3 G/DL (ref 31–37)
MCV RBC AUTO: 97.6 FL (ref 74–97)
MONOCYTES # BLD: 0.5 K/UL (ref 0.05–1.2)
MONOCYTES NFR BLD: 8 % (ref 3–10)
NEUTS SEG # BLD: 3.5 K/UL (ref 1.8–8)
NEUTS SEG NFR BLD: 52 % (ref 40–73)
NITRITE UR QL STRIP.AUTO: NEGATIVE
PH UR STRIP: 8 [PH] (ref 5–8)
PLATELET # BLD AUTO: 267 K/UL (ref 135–420)
PMV BLD AUTO: 10.6 FL (ref 9.2–11.8)
POTASSIUM SERPL-SCNC: 4.1 MMOL/L (ref 3.5–5.5)
PROT SERPL-MCNC: 7.2 G/DL (ref 6.4–8.2)
PROT UR STRIP-MCNC: NEGATIVE MG/DL
RBC # BLD AUTO: 4.59 M/UL (ref 4.2–5.3)
SODIUM SERPL-SCNC: 139 MMOL/L (ref 136–145)
SP GR UR REFRACTOMETRY: 1.01 (ref 1–1.03)
TRIGL SERPL-MCNC: 126 MG/DL (ref ?–150)
TSH SERPL DL<=0.05 MIU/L-ACNC: 1.16 UIU/ML (ref 0.36–3.74)
UROBILINOGEN UR QL STRIP.AUTO: 0.2 EU/DL (ref 0.2–1)
VLDLC SERPL CALC-MCNC: 25.2 MG/DL
WBC # BLD AUTO: 6.7 K/UL (ref 4.6–13.2)

## 2018-05-10 PROCEDURE — 81003 URINALYSIS AUTO W/O SCOPE: CPT | Performed by: FAMILY MEDICINE

## 2018-05-10 PROCEDURE — 84443 ASSAY THYROID STIM HORMONE: CPT | Performed by: FAMILY MEDICINE

## 2018-05-10 PROCEDURE — 80061 LIPID PANEL: CPT | Performed by: FAMILY MEDICINE

## 2018-05-10 PROCEDURE — 80053 COMPREHEN METABOLIC PANEL: CPT | Performed by: FAMILY MEDICINE

## 2018-05-10 PROCEDURE — 36415 COLL VENOUS BLD VENIPUNCTURE: CPT | Performed by: FAMILY MEDICINE

## 2018-05-10 PROCEDURE — 85025 COMPLETE CBC W/AUTO DIFF WBC: CPT | Performed by: FAMILY MEDICINE

## 2018-05-10 RX ORDER — LISINOPRIL AND HYDROCHLOROTHIAZIDE 12.5; 2 MG/1; MG/1
TABLET ORAL
Qty: 45 TAB | Refills: 3
Start: 2018-05-10 | End: 2018-10-10 | Stop reason: SDUPTHER

## 2018-05-10 NOTE — PATIENT INSTRUCTIONS
Body Mass Index: Care Instructions  Your Care Instructions    Body mass index (BMI) can help you see if your weight is raising your risk for health problems. It uses a formula to compare how much you weigh with how tall you are. · A BMI lower than 18.5 is considered underweight. · A BMI between 18.5 and 24.9 is considered healthy. · A BMI between 25 and 29.9 is considered overweight. A BMI of 30 or higher is considered obese. If your BMI is in the normal range, it means that you have a lower risk for weight-related health problems. If your BMI is in the overweight or obese range, you may be at increased risk for weight-related health problems, such as high blood pressure, heart disease, stroke, arthritis or joint pain, and diabetes. If your BMI is in the underweight range, you may be at increased risk for health problems such as fatigue, lower protection (immunity) against illness, muscle loss, bone loss, hair loss, and hormone problems. BMI is just one measure of your risk for weight-related health problems. You may be at higher risk for health problems if you are not active, you eat an unhealthy diet, or you drink too much alcohol or use tobacco products. Follow-up care is a key part of your treatment and safety. Be sure to make and go to all appointments, and call your doctor if you are having problems. It's also a good idea to know your test results and keep a list of the medicines you take. How can you care for yourself at home? · Practice healthy eating habits. This includes eating plenty of fruits, vegetables, whole grains, lean protein, and low-fat dairy. · If your doctor recommends it, get more exercise. Walking is a good choice. Bit by bit, increase the amount you walk every day. Try for at least 30 minutes on most days of the week. · Do not smoke. Smoking can increase your risk for health problems. If you need help quitting, talk to your doctor about stop-smoking programs and medicines. These can increase your chances of quitting for good. · Limit alcohol to 2 drinks a day for men and 1 drink a day for women. Too much alcohol can cause health problems. If you have a BMI higher than 25  · Your doctor may do other tests to check your risk for weight-related health problems. This may include measuring the distance around your waist. A waist measurement of more than 40 inches in men or 35 inches in women can increase the risk of weight-related health problems. · Talk with your doctor about steps you can take to stay healthy or improve your health. You may need to make lifestyle changes to lose weight and stay healthy, such as changing your diet and getting regular exercise. If you have a BMI lower than 18.5  · Your doctor may do other tests to check your risk for health problems. · Talk with your doctor about steps you can take to stay healthy or improve your health. You may need to make lifestyle changes to gain or maintain weight and stay healthy, such as getting more healthy foods in your diet and doing exercises to build muscle. Where can you learn more? Go to http://tee-sergio.info/. Enter S176 in the search box to learn more about \"Body Mass Index: Care Instructions. \"  Current as of: October 13, 2016  Content Version: 11.4  © 5231-8723 [employer]. Care instructions adapted under license by [employer] (which disclaims liability or warranty for this information). If you have questions about a medical condition or this instruction, always ask your healthcare professional. [place] [address] any warranty or liability for your use of this information. Starting a Weight Loss Plan: Care Instructions  Your Care Instructions    If you are thinking about losing weight, it can be hard to know where to start. Your doctor can help you set up a weight loss plan that best meets your needs.  You may want to take a class on nutrition or exercise, or join a weight loss support group. If you have questions about how to make changes to your eating or exercise habits, ask your doctor about seeing a registered dietitian or an exercise specialist.  It can be a big challenge to lose weight. But you do not have to make huge changes at once. Make small changes, and stick with them. When those changes become habit, add a few more changes. If you do not think you are ready to make changes right now, try to pick a date in the future. Make an appointment to see your doctor to discuss whether the time is right for you to start a plan. Follow-up care is a key part of your treatment and safety. Be sure to make and go to all appointments, and call your doctor if you are having problems. It's also a good idea to know your test results and keep a list of the medicines you take. How can you care for yourself at home? · Set realistic goals. Many people expect to lose much more weight than is likely. A weight loss of 5% to 10% of your body weight may be enough to improve your health. · Get family and friends involved to provide support. Talk to them about why you are trying to lose weight, and ask them to help. They can help by participating in exercise and having meals with you, even if they may be eating something different. · Find what works best for you. If you do not have time or do not like to cook, a program that offers meal replacement bars or shakes may be better for you. Or if you like to prepare meals, finding a plan that includes daily menus and recipes may be best.  · Ask your doctor about other health professionals who can help you achieve your weight loss goals. ¨ A dietitian can help you make healthy changes in your diet.   ¨ An exercise specialist or  can help you develop a safe and effective exercise program.  ¨ A counselor or psychiatrist can help you cope with issues such as depression, anxiety, or family problems that can make it hard to focus on weight loss. · Consider joining a support group for people who are trying to lose weight. Your doctor can suggest groups in your area. Where can you learn more? Go to http://tee-sergio.info/. Enter Q283 in the search box to learn more about \"Starting a Weight Loss Plan: Care Instructions. \"  Current as of: October 13, 2016  Content Version: 11.4  © 7801-5500 [employer]. Care instructions adapted under license by [employer] (which disclaims liability or warranty for this information). If you have questions about a medical condition or this instruction, always ask your healthcare professional. [address] any warranty or liability for your use of this information.

## 2018-05-10 NOTE — PROGRESS NOTES
Alvin Hamilton is a 65 y.o. female presents in office forf/u. There are no preventive care reminders to display for this patient. 1. Have you been to the ER, urgent care clinic since your last visit? Hospitalized since your last visit? No    2. Have you seen or consulted any other health care providers outside of the [address] [place] [address] since your last visit? Include any pap smears or colon screening.  No

## 2018-05-10 NOTE — PROGRESS NOTES
SUBJECTIVE:    Chief Complaint   Patient presents with    Hypertension     f/u    Cholesterol Problem     f/u    Depression     f/u     HPI:     Her BP is being treated and controlled. Some times it runs a little low systolic 207 and then she takes half a pill. Her lisinopril HCT was decreased to lisinopril only in the past because of low blood pressure. However her blood pressure increased significantly and she was put back on hydrochlorothiazide. Her depression/ anxiety is controlled. Congestion in both ears is controlled with Rhinocort w/o side effects. She is dieting for lipids and weight. Her elevated lipids treated with diet alone. Rt foot hurts at bottom in AM after she walks on high heel shoes day before. Going on for last few weeks. No injury. Past Medical History:   Diagnosis Date    Acne     Breast nodule     right    Cervicalgia     Depression     Elevated LDL cholesterol level     Hypertension 1-1-2010    Multiple nevi     on skin    Uterine fibroid     with menorrhagia     Past Surgical History:   Procedure Laterality Date    HX TONSIL AND ADENOIDECTOMY         Current Outpatient Prescriptions   Medication Sig    OTHER daily as needed. Alkalol nasal rinse. Alternate with the Rhinacort.  traZODone (DESYREL) 50 mg tablet TAKE ONE TABLET AT BEDTIME    lisinopril-hydroCHLOROthiazide (PRINZIDE, ZESTORETIC) 20-12.5 mg per tablet TAKE 1 TABLET DAILY    budesonide (RHINOCORT AQUA) 32 mcg/actuation nasal spray INSTILL 2 SPRAYS INTO EACH NOSTRIL EVERY NIGHT AT BEDTIME    aspirin (ASPIRIN) 325 mg tablet Take 325 mg by mouth two (2) times daily as needed.  multivitamin (ONE A DAY) tablet Take 1 Tab by mouth daily. No current facility-administered medications for this visit. Allergies: No known allergy to drugs. ROS:   Constitutional: No fever, chills, night sweats, malaise, dizziness.   Ear/Nose/Throat: ear congestion as above; no earache, lesions, new speaking or hearing problems, nose bleeds. Cardiovascular: No angina, palpitations, PND, orthopnea, lightheadedness, edema, claudication. Respiratory: No dyspnea, wheeze, pleurisy, hemoptysis, unusual sputum. Gastrointestinal: No nausea/ vomiting, bowel habit change, pain, PAU symptoms, melena, hematochezia, anorexia. Psychiatric: No agitation, confusion/disorientation, suicidal or homicidal ideation. Musculoskeletal: No focal weakness, stiffness/rigidity, radicular pain. Skin: no other problems. OBJECTIVE  Constitutional: General Appearance:  well developed, well nourished, nontoxic, in no acute distress. Visit Vitals    BP 92/70 (BP 1 Location: Left arm, BP Patient Position: Sitting)    Pulse (!) 56    Temp 98 °F (36.7 °C) (Oral)    Resp 18    Ht 5' 5\" (1.651 m)    Wt 156 lb 6.4 oz (70.9 kg)    SpO2 98%    BMI 26.03 kg/m2     BP: 110/80 after fluid intake. Came to office WO food or fluids. Eyes[de-identified] Conjunctiva: normal & Lids: normal.  Pupils & Irises: normal size; equal, round and reactive to light. Otoscopic Examination: external auditory canals are clear; tympanic membranes are dull. Nasal Cavity: mildly swollen mucosa & turbinates. Maxillas are not tender w/o redness or heat. Throat: clear tonsils, oropharynx, posterior pharynx. Pulmonary: Respiratory effort: normal; no dyspnea, no retractions, no accessory muscle use. Auscultation: normal & symmetrical air exchange; no rales, no rhonchi, no wheeze; no rubs    Cardiovascular: Palpation: PMI not displaced or enlarged, no thrills or heaves. Auscultation: RRR; no murmur, rubs or gallops. Extremities: no edema, no active varicosity. Gastrointestinal: Normal bowel sounds. No masses; no tenderness; no rebound/rigidity; no CVA tenderness. No hepatosplenomegaly. Psychiatric: Oriented to time, place and person. Musculoskeletal[de-identified]   All Ext:  Normal strength & tone.    Rt foot: proximal planter fascia area tender w/o redness, heat or swelling. Neurological[de-identified] CN I to XII: intact. DTR: symmetrical & normal.      Lab Results   Component Value Date/Time    Vitamin D 25-Hydroxy 36.1 03/23/2017 08:35 AM         Lab Results   Component Value Date/Time    WBC 6.9 03/23/2017 08:35 AM    HGB 14.7 03/23/2017 08:35 AM    HCT 44.7 03/23/2017 08:35 AM    PLATELET 499 29/66/6817 08:35 AM    MCV 97.8 (H) 03/23/2017 08:35 AM     Lab Results   Component Value Date/Time    Cholesterol, total 232 (H) 10/19/2017 08:14 AM    HDL Cholesterol 63 (H) 10/19/2017 08:14 AM    LDL, calculated 132.8 (H) 10/19/2017 08:14 AM    VLDL, calculated 36.2 10/19/2017 08:14 AM    Triglyceride 181 (H) 10/19/2017 08:14 AM    CHOL/HDL Ratio 3.7 10/19/2017 08:14 AM     Lab Results   Component Value Date/Time    Sodium 140 10/19/2017 08:14 AM    Potassium 4.0 10/19/2017 08:14 AM    Chloride 103 10/19/2017 08:14 AM    CO2 29 10/19/2017 08:14 AM    Anion gap 8 10/19/2017 08:14 AM    Glucose 87 10/19/2017 08:14 AM    BUN 14 10/19/2017 08:14 AM    Creatinine 0.75 10/19/2017 08:14 AM    BUN/Creatinine ratio 19 10/19/2017 08:14 AM    GFR est AA >60 10/19/2017 08:14 AM    GFR est non-AA >60 10/19/2017 08:14 AM    Calcium 9.6 10/19/2017 08:14 AM    Bilirubin, total 0.6 03/23/2017 08:35 AM    AST (SGOT) 28 03/23/2017 08:35 AM    Alk. phosphatase 72 03/23/2017 08:35 AM    Protein, total 7.3 03/23/2017 08:35 AM    Albumin 4.3 03/23/2017 08:35 AM    Globulin 3.0 03/23/2017 08:35 AM    A-G Ratio 1.4 03/23/2017 08:35 AM    ALT (SGPT) 38 03/23/2017 08:35 AM     Lab Results   Component Value Date/Time    TSH 1.58 03/23/2017 08:35 AM     ASSESSMENT    1. Essential hypertension    2. Recurrent depression ([facility] [address]    3. Hyperlipidemia, unspecified hyperlipidemia type    4. Plantar fasciitis of right foot    5. Overweight (BMI 25.0-29. 9)        PLAN    Pharmacologic Management: Medications reviewed with the patient. Decrease lisnoprilHCT 20/12.5 to 1/2 tab daily.   Consider lisinopril-HCT 10-12.5 daily. Orders Placed This Encounter    CBC WITH AUTOMATED DIFF    METABOLIC PANEL, COMPREHENSIVE    LIPID PANEL    TSH 3RD GENERATION    URINALYSIS W/ RFLX MICROSCOPIC    OTHER    lisinopril-hydroCHLOROthiazide (PRINZIDE, ZESTORETIC) 20-12.5 mg per tablet     Discussed the patient's BMI with her. The BMI follow up plan is as follows:   -dietary management education, guidance, and counseling  -encourage exercise  -monitor weight prescribed dietary intake  -reviewed instructions. Check BP carefully at home. Gentle foot ROM exercises, moist heat and posture care. Discussed DDx, follow-up & work-up. Discussed risk/benefit & side effect of treatment. Lipid lowering low salt diet, weight loss and graduated exercise. Allergy care. Follow up for regular OV, prn sooner. Health risk from non adherence discussed. Patient voiced understanding. Follow-up Disposition:  Return in about 3 months (around 8/10/2018).      Shivani Nguyen MD

## 2018-05-11 NOTE — PROGRESS NOTES
LDL cholesterol was a little better. The patient is advised to continue with diet and exercise. Otherwise lab work was good.

## 2018-08-09 ENCOUNTER — OFFICE VISIT (OUTPATIENT)
Dept: FAMILY MEDICINE CLINIC | Age: 58
End: 2018-08-09

## 2018-08-09 VITALS
SYSTOLIC BLOOD PRESSURE: 108 MMHG | TEMPERATURE: 98.2 F | OXYGEN SATURATION: 97 % | RESPIRATION RATE: 14 BRPM | WEIGHT: 158.8 LBS | DIASTOLIC BLOOD PRESSURE: 70 MMHG | HEART RATE: 68 BPM | HEIGHT: 65 IN | BODY MASS INDEX: 26.46 KG/M2

## 2018-08-09 DIAGNOSIS — E78.5 HYPERLIPIDEMIA, UNSPECIFIED HYPERLIPIDEMIA TYPE: ICD-10-CM

## 2018-08-09 DIAGNOSIS — F33.9 RECURRENT DEPRESSION (HCC): ICD-10-CM

## 2018-08-09 DIAGNOSIS — I10 ESSENTIAL HYPERTENSION: Primary | ICD-10-CM

## 2018-08-09 RX ORDER — LISINOPRIL AND HYDROCHLOROTHIAZIDE 12.5; 2 MG/1; MG/1
TABLET ORAL
Qty: 45 TAB | Refills: 3 | Status: CANCELLED | OUTPATIENT
Start: 2018-08-09

## 2018-08-09 RX ORDER — TRAZODONE HYDROCHLORIDE 50 MG/1
TABLET ORAL
Qty: 90 TAB | Refills: 3 | Status: CANCELLED | OUTPATIENT
Start: 2018-08-09

## 2018-08-09 NOTE — PROGRESS NOTES
SUBJECTIVE:    Chief Complaint   Patient presents with    Hypertension    Cholesterol Problem    Depression     HPI:     Her BP is being treated and controlled. Some times it runs a little high systolic 162 and then she takes a full pill. Her lisinopril HCT was decreased to lisinopril only in the past because of low blood pressure. However her blood pressure increased significantly and she was put back on hydrochlorothiazide. Her depression/ anxiety is controlled. Congestion in both ears is controlled with Rhinocort w/o side effects. She is dieting for lipids and weight. Her elevated lipids treated with diet alone. Past Medical History:   Diagnosis Date    Acne     Breast nodule     right    Cervicalgia     Depression     Elevated LDL cholesterol level     Hypertension 1-1-2010    Multiple nevi     on skin    Uterine fibroid     with menorrhagia     Past Surgical History:   Procedure Laterality Date    HX TONSIL AND ADENOIDECTOMY       Current Outpatient Prescriptions   Medication Sig    OTHER daily as needed. Alkalol nasal rinse. Alternate with the Rhinacort.  lisinopril-hydroCHLOROthiazide (PRINZIDE, ZESTORETIC) 20-12.5 mg per tablet TAKE 1/2 TABLET DAILY    traZODone (DESYREL) 50 mg tablet TAKE ONE TABLET AT BEDTIME    budesonide (RHINOCORT AQUA) 32 mcg/actuation nasal spray INSTILL 2 SPRAYS INTO EACH NOSTRIL EVERY NIGHT AT BEDTIME    aspirin (ASPIRIN) 325 mg tablet Take 325 mg by mouth two (2) times daily as needed.  multivitamin (ONE A DAY) tablet Take 1 Tab by mouth daily. No current facility-administered medications for this visit. Allergies: No known allergy to drugs. ROS:   Constitutional: No fever, chills, night sweats, malaise, dizziness. Ear/Nose/Throat: no earache, lesions, new speaking or hearing problems, nose bleeds. Cardiovascular: No angina, palpitations, PND, orthopnea, lightheadedness, edema, claudication.     Respiratory: No dyspnea, wheeze, pleurisy, hemoptysis, unusual sputum. Gastrointestinal: No nausea/ vomiting, bowel habit change, pain, PAU symptoms, melena, hematochezia, anorexia. Psychiatric: No agitation, confusion/disorientation, suicidal or homicidal ideation. Musculoskeletal: No focal weakness, stiffness/rigidity, radicular pain. Skin: no other problems. OBJECTIVE  Constitutional: General Appearance:  well developed, well nourished, nontoxic, in no acute distress. Visit Vitals    /70 (BP 1 Location: Left arm, BP Patient Position: Sitting)    Pulse 68    Temp 98.2 °F (36.8 °C) (Oral)    Resp 14    Ht 5' 5\" (1.651 m)    Wt 158 lb 12.8 oz (72 kg)    SpO2 97%    BMI 26.43 kg/m2     Pulmonary: Respiratory effort: normal; no dyspnea, no retractions, no accessory muscle use. Auscultation: normal & symmetrical air exchange; no rales, no rhonchi, no wheeze; no rubs    Cardiovascular: Palpation: PMI not displaced or enlarged, no thrills or heaves. Auscultation: RRR; no murmur, rubs or gallops. Extremities: no edema, no active varicosity. Gastrointestinal: Normal bowel sounds. No masses; no tenderness; no rebound/rigidity; no CVA tenderness. No hepatosplenomegaly. Psychiatric: Oriented to time, place and person. Musculoskeletal[de-identified] NC/ AT, neck supple. Neurological[de-identified] CN I to XII: intact.   DTR: symmetrical & normal.      Lab Results   Component Value Date/Time    Vitamin D 25-Hydroxy 36.1 03/23/2017 08:35 AM         Lab Results   Component Value Date/Time    WBC 6.7 05/10/2018 09:24 AM    HGB 14.9 05/10/2018 09:24 AM    HCT 44.8 05/10/2018 09:24 AM    PLATELET 551 38/91/4643 09:24 AM    MCV 97.6 (H) 05/10/2018 09:24 AM     Lab Results   Component Value Date/Time    Cholesterol, total 201 (H) 05/10/2018 09:24 AM    HDL Cholesterol 51 05/10/2018 09:24 AM    LDL, calculated 124.8 (H) 05/10/2018 09:24 AM    VLDL, calculated 25.2 05/10/2018 09:24 AM    Triglyceride 126 05/10/2018 09:24 AM    CHOL/HDL Ratio 3.9 05/10/2018 09:24 AM     Lab Results   Component Value Date/Time    Sodium 139 05/10/2018 09:24 AM    Potassium 4.1 05/10/2018 09:24 AM    Chloride 104 05/10/2018 09:24 AM    CO2 25 05/10/2018 09:24 AM    Anion gap 10 05/10/2018 09:24 AM    Glucose 86 05/10/2018 09:24 AM    BUN 11 05/10/2018 09:24 AM    Creatinine 0.82 05/10/2018 09:24 AM    BUN/Creatinine ratio 13 05/10/2018 09:24 AM    GFR est AA >60 05/10/2018 09:24 AM    GFR est non-AA >60 05/10/2018 09:24 AM    Calcium 9.0 05/10/2018 09:24 AM    Bilirubin, total 0.5 05/10/2018 09:24 AM    AST (SGOT) 23 05/10/2018 09:24 AM    Alk. phosphatase 64 05/10/2018 09:24 AM    Protein, total 7.2 05/10/2018 09:24 AM    Albumin 4.3 05/10/2018 09:24 AM    Globulin 2.9 05/10/2018 09:24 AM    A-G Ratio 1.5 05/10/2018 09:24 AM    ALT (SGPT) 33 05/10/2018 09:24 AM     Lab Results   Component Value Date/Time    TSH 1.16 05/10/2018 09:24 AM     CVD risk 2.3%    ASSESSMENT    1. Essential hypertension    2. Recurrent depression ([facility] [facility]    3. Hyperlipidemia, unspecified hyperlipidemia type        PLAN    Pharmacologic Management: Medications reviewed with the patient. Continue with  lisnoprilHCT 20/12.5 to 1/2 tab daily. Monitor BP carefully at home. Discussed DDx, follow-up & work-up. Discussed risk/benefit & side effect of treatment. Lipid lowering low salt diet, weight loss and graduated exercise. Allergy care. Follow up for regular OV, prn sooner. Health risk from non adherence discussed. Patient voiced understanding. Follow-up Disposition:  Return in about 3 months (around 11/9/2018).      Rajesh Hobbs MD

## 2018-08-09 NOTE — MR AVS SNAPSHOT
[address] [place] [address] 
[phone] Patient: Leonor Sandhoff MRN: [id number] :1960 Visit Information Date & Time Provider Department Dept. Phone Encounter #  
 2018  9:00 AM Grabiel Galicia MD [address] [facility] [phone] [id number] Follow-up Instructions Return in about 3 months (around 2018). Upcoming Health Maintenance Date Due Influenza Age 5 to Adult 2018* PAP AKA CERVICAL CYTOLOGY 2019 BREAST CANCER SCRN MAMMOGRAM 2020 COLONOSCOPY 2024 DTaP/Tdap/Td series (2 - Td) 2026 *Topic was postponed. The date shown is not the original due date. Allergies as of 2018  Review Complete On: 2018 By: Grabiel Galicia MD  
 No Known Allergies Current Immunizations  Never Reviewed Name Date Influenza Vaccine 10/1/2017 Tdap 2016  9:30 AM  
  
 Not reviewed this visit You Were Diagnosed With   
  
 Codes Comments Essential hypertension    -  Primary ICD-10-CM: I10 
ICD-9-CM: 401.9 Rec[facility] ([facility]     ICD-10-CM: F33.9 ICD-9-CM: 296.30 Hyperlipidemia, unspecified hyperlipidemia type     ICD-10-CM: E78.5 ICD-9-CM: 272.4 Vitals BP Pulse Temp Resp Height(growth percentile) Weight(growth percentile) 108/70 (BP 1 Location: Left arm, BP Patient Position: Sitting) 68 98.2 °F (36.8 °C) (Oral) 14 5' 5\" (1.651 m) 158 lb 12.8 oz (72 kg) LMP SpO2 BMI OB Status Smoking Status 2015 (LMP Unknown) 97% 26.43 kg/m2 Postmenopausal Former Smoker Vitals History BMI and BSA Data Body Mass Index Body Surface Area  
 26.43 kg/m 2 1.82 m 2 Preferred Pharmacy Pharmacy Name Phone [facility], [facility] [phone] Your Updated Medication List  
  
   
 This list is accurate as of 8/9/18 10:04 AM.  Always use your most recent med list.  
  
  
  
  
 aspirin 325 mg tablet Commonly known as:  ASPIRIN Take 325 mg by mouth two (2) times daily as needed. budesonide 32 mcg/actuation nasal spray Commonly known as:  RHINOCORT AQUA INSTILL 2 SPRAYS INTO EACH NOSTRIL EVERY NIGHT AT BEDTIME  
  
 lisinopril-hydroCHLOROthiazide 20-12.5 mg per tablet Commonly known as:  PRINZIDE, ZESTORETIC  
TAKE 1/2 TABLET DAILY  
  
 multivitamin tablet Commonly known as:  ONE A DAY Take 1 Tab by mouth daily. OTHER  
daily as needed. Alkalol nasal rinse. Alternate with the Rhinacort. traZODone 50 mg tablet Commonly known as:  DESYREL  
TAKE ONE TABLET AT BEDTIME Follow-up Instructions Return in about 3 months (around 11/9/2018). Introducing [facility]! [facility] introduces [employer] patient portal. Now you can access parts of your medical record, email your doctor's office, and request medication refills online. 1. In your internet browser, go to [email or web address] 2. Click on the First Time User? Click Here link in the Sign In box. You will see the New Member Sign Up page. 3. Enter your [employer] Access Code exactly as it appears below. You will not need to use this code after youve completed the sign-up process. If you do not sign up before the expiration date, you must request a new code. · [employer] Access Code: [email or web address] Expires: 11/7/2018 10:04 AM 
 
4. Enter the last four digits of your Social Security Number (xxxx) and Date of Birth (mm/dd/yyyy) as indicated and click Submit. You will be taken to the next sign-up page. 5. Create a [employer] ID. This will be your [employer] login ID and cannot be changed, so think of one that is secure and easy to remember. 6. Create a [employer] password. You can change your password at any time. 7. Enter your Password Reset Question and Answer. This can be used at a later time if you forget your password. 8. Enter your e-mail address. You will receive e-mail notification when new information is available in [address] 9. Click Sign Up. You can now view and download portions of your medical record. 10. Click the Download Summary menu link to download a portable copy of your medical information. If you have questions, please visit the Frequently Asked Questions section of the [employer] website. Remember, [employer] is NOT to be used for urgent needs. For medical emergencies, dial 911. Now available from your iPhone and Android! Please provide this summary of care documentation to your next provider. Your primary care clinician is listed as Liana Gotez. If you have any questions after today's visit, please call [phone].

## 2018-10-09 RX ORDER — LISINOPRIL AND HYDROCHLOROTHIAZIDE 12.5; 2 MG/1; MG/1
TABLET ORAL
Qty: 90 TAB | Refills: 3 | OUTPATIENT
Start: 2018-10-09

## 2018-10-10 RX ORDER — LISINOPRIL AND HYDROCHLOROTHIAZIDE 12.5; 2 MG/1; MG/1
TABLET ORAL
Qty: 45 TAB | Refills: 1 | Status: SHIPPED | OUTPATIENT
Start: 2018-10-10 | End: 2018-12-13 | Stop reason: SDUPTHER

## 2018-10-10 NOTE — TELEPHONE ENCOUNTER
Requested Prescriptions     Pending Prescriptions Disp Refills    lisinopril-hydroCHLOROthiazide (PRINZIDE, ZESTORETIC) 20-12.5 mg per tablet 45 Tab 1     Sig: TAKE 1/2 TABLET DAILY     Last Refill: 05/10/18

## 2018-10-10 NOTE — TELEPHONE ENCOUNTER
Pt returning call to [facility] to let him know that she did request the refill on Lisinopril & express scripts requested the med as well. Please advise.

## 2018-11-07 ENCOUNTER — HOSPITAL ENCOUNTER (OUTPATIENT)
Dept: LAB | Age: 58
Discharge: HOME | End: 2018-11-07
Payer: COMMERCIAL

## 2018-11-07 ENCOUNTER — OFFICE VISIT (OUTPATIENT)
Dept: FAMILY MEDICINE CLINIC | Age: 58
End: 2018-11-07

## 2018-11-07 VITALS
WEIGHT: 158.6 LBS | HEIGHT: 65 IN | OXYGEN SATURATION: 96 % | DIASTOLIC BLOOD PRESSURE: 74 MMHG | RESPIRATION RATE: 16 BRPM | BODY MASS INDEX: 26.42 KG/M2 | HEART RATE: 56 BPM | TEMPERATURE: 98.2 F | SYSTOLIC BLOOD PRESSURE: 114 MMHG

## 2018-11-07 DIAGNOSIS — R00.2 PALPITATIONS: ICD-10-CM

## 2018-11-07 DIAGNOSIS — F33.9 RECURRENT DEPRESSION (HCC): ICD-10-CM

## 2018-11-07 DIAGNOSIS — I10 ESSENTIAL HYPERTENSION: Primary | ICD-10-CM

## 2018-11-07 DIAGNOSIS — I10 ESSENTIAL HYPERTENSION: ICD-10-CM

## 2018-11-07 DIAGNOSIS — E78.5 HYPERLIPIDEMIA, UNSPECIFIED HYPERLIPIDEMIA TYPE: ICD-10-CM

## 2018-11-07 LAB
ANION GAP SERPL CALC-SCNC: 7 MMOL/L (ref 3–18)
BASOPHILS # BLD: 0 K/UL (ref 0–0.1)
BASOPHILS NFR BLD: 1 % (ref 0–2)
BUN SERPL-MCNC: 13 MG/DL (ref 7–18)
BUN/CREAT SERPL: 17 (ref 12–20)
CALCIUM SERPL-MCNC: 9.4 MG/DL (ref 8.5–10.1)
CHLORIDE SERPL-SCNC: 105 MMOL/L (ref 100–108)
CO2 SERPL-SCNC: 30 MMOL/L (ref 21–32)
CREAT SERPL-MCNC: 0.75 MG/DL (ref 0.6–1.3)
DIFFERENTIAL METHOD BLD: ABNORMAL
EOSINOPHIL # BLD: 0.2 K/UL (ref 0–0.4)
EOSINOPHIL NFR BLD: 2 % (ref 0–5)
ERYTHROCYTE [DISTWIDTH] IN BLOOD BY AUTOMATED COUNT: 12 % (ref 11.6–14.5)
GLUCOSE SERPL-MCNC: 96 MG/DL (ref 74–99)
HCT VFR BLD AUTO: 42.9 % (ref 35–45)
HGB BLD-MCNC: 14.2 G/DL (ref 12–16)
LYMPHOCYTES # BLD: 2.2 K/UL (ref 0.9–3.6)
LYMPHOCYTES NFR BLD: 32 % (ref 21–52)
MAGNESIUM SERPL-MCNC: 2.1 MG/DL (ref 1.6–2.6)
MCH RBC QN AUTO: 32.3 PG (ref 24–34)
MCHC RBC AUTO-ENTMCNC: 33.1 G/DL (ref 31–37)
MCV RBC AUTO: 97.7 FL (ref 74–97)
MONOCYTES # BLD: 0.6 K/UL (ref 0.05–1.2)
MONOCYTES NFR BLD: 9 % (ref 3–10)
NEUTS SEG # BLD: 4 K/UL (ref 1.8–8)
NEUTS SEG NFR BLD: 56 % (ref 40–73)
PLATELET # BLD AUTO: 220 K/UL (ref 135–420)
PMV BLD AUTO: 9.9 FL (ref 9.2–11.8)
POTASSIUM SERPL-SCNC: 4 MMOL/L (ref 3.5–5.5)
RBC # BLD AUTO: 4.39 M/UL (ref 4.2–5.3)
SODIUM SERPL-SCNC: 142 MMOL/L (ref 136–145)
WBC # BLD AUTO: 7 K/UL (ref 4.6–13.2)

## 2018-11-07 PROCEDURE — 80048 BASIC METABOLIC PNL TOTAL CA: CPT | Performed by: FAMILY MEDICINE

## 2018-11-07 PROCEDURE — 85025 COMPLETE CBC W/AUTO DIFF WBC: CPT | Performed by: FAMILY MEDICINE

## 2018-11-07 PROCEDURE — 83735 ASSAY OF MAGNESIUM: CPT | Performed by: FAMILY MEDICINE

## 2018-11-07 NOTE — PROGRESS NOTES
SUBJECTIVE: 
 
Chief Complaint Patient presents with  Hypertension  Cholesterol Problem HPI:  
 
Her BP is being treated. She feels some \"flutter\" in her chest for a few seconds off and on since 3 months ago usually when she is under stress about 2-3 times a month. Her blood pressure goes up and now she is taking whole tablet of lisinopril-HCT. She denies any chest pain, lightheadedness, dyspnea. Her depression/ anxiety is controlled. She is dieting for lipids and weight. Her elevated lipids treated with diet alone. Past Medical History:  
Diagnosis Date  Acne  Breast nodule   
 right  Cervicalgia  Depression  Elevated LDL cholesterol level  Hypertension 1-1-2010  Multiple nevi   
 on skin  Uterine fibroid   
 with menorrhagia Past Surgical History:  
Procedure Laterality Date  HX TONSIL AND ADENOIDECTOMY Current Outpatient Medications Medication Sig  varicella-zoster recombinant, PF, (SHINGRIX, PF,) 50 mcg/0.5 mL susr injection 0.5 mL by IntraMUSCular route once for 1 dose.  OTHER daily as needed. Alkalol nasal rinse. Alternate with the Rhinacort.  traZODone (DESYREL) 50 mg tablet TAKE ONE TABLET AT BEDTIME  budesonide (RHINOCORT AQUA) 32 mcg/actuation nasal spray INSTILL 2 SPRAYS INTO EACH NOSTRIL EVERY NIGHT AT BEDTIME  aspirin (ASPIRIN) 325 mg tablet Take 325 mg by mouth two (2) times daily as needed.  multivitamin (ONE A DAY) tablet Take 1 Tab by mouth daily.  lisinopril-hydroCHLOROthiazide (PRINZIDE, ZESTORETIC) 20-12.5 mg per tablet TAKE 1/2 TABLET DAILY (Patient taking differently: 1 Tab. TAKE 1/2 TABLET DAILY) No current facility-administered medications for this visit. Allergies: No known allergy to drugs. ROS:  
Constitutional: No fever, chills, night sweats, malaise, dizziness. Ear/Nose/Throat: no earache, lesions, new speaking or hearing problems, nose bleeds. Cardiovascular: No angina, PND, orthopnea, lightheadedness, edema, claudication. Respiratory: No dyspnea, wheeze, pleurisy, hemoptysis, unusual sputum. Gastrointestinal: No nausea/ vomiting, bowel habit change, pain, PAU symptoms, melena, hematochezia, anorexia. Psychiatric: No agitation, confusion/disorientation, suicidal or homicidal ideation. Musculoskeletal: No focal weakness, stiffness/rigidity, radicular pain. Skin: no other problems. OBJECTIVE Constitutional: General Appearance:  well developed, well nourished, nontoxic, in no acute distress. Visit Vitals /74 (BP 1 Location: Left arm, BP Patient Position: Sitting) Pulse (!) 56 Temp 98.2 °F (36.8 °C) (Oral) Resp 16 Ht 5' 5\" (1.651 m) Wt 158 lb 9.6 oz (71.9 kg) SpO2 96% BMI 26.39 kg/m² Pulmonary: Respiratory effort: normal; no dyspnea, no retractions, no accessory muscle use. Auscultation: normal & symmetrical air exchange; no rales, no rhonchi, no wheeze; no rubs Cardiovascular: Palpation: PMI not displaced or enlarged, no thrills or heaves. Auscultation: RRR; no murmur, rubs or gallops. Extremities: no edema, no active varicosity. Gastrointestinal: Normal bowel sounds. No masses; no tenderness; no rebound/rigidity; no CVA tenderness. No hepatosplenomegaly. Psychiatric: Oriented to time, place and person. Musculoskeletal[de-identified] NC/ AT, neck supple. Neurological[de-identified] CN I to XII: intact. DTR: symmetrical & normal. 
 
EKG today: Sinus bradycardia at 51 bpm.  Otherwise within normal limits. Lab Results Component Value Date/Time Vitamin D 25-Hydroxy 36.1 03/23/2017 08:35 AM  
   
 
Lab Results Component Value Date/Time WBC 6.7 05/10/2018 09:24 AM  
 HGB 14.9 05/10/2018 09:24 AM  
 HCT 44.8 05/10/2018 09:24 AM  
 PLATELET 814 43/21/2505 09:24 AM  
 MCV 97.6 (H) 05/10/2018 09:24 AM  
 
Lab Results Component Value Date/Time  Cholesterol, total 201 (H) 05/10/2018 09:24 AM  
 HDL Cholesterol 51 05/10/2018 09:24 AM  
 LDL, calculated 124.8 (H) 05/10/2018 09:24 AM  
 VLDL, calculated 25.2 05/10/2018 09:24 AM  
 Triglyceride 126 05/10/2018 09:24 AM  
 CHOL/HDL Ratio 3.9 05/10/2018 09:24 AM  
 
Lab Results Component Value Date/Time Sodium 139 05/10/2018 09:24 AM  
 Potassium 4.1 05/10/2018 09:24 AM  
 Chloride 104 05/10/2018 09:24 AM  
 CO2 25 05/10/2018 09:24 AM  
 Anion gap 10 05/10/2018 09:24 AM  
 Glucose 86 05/10/2018 09:24 AM  
 BUN 11 05/10/2018 09:24 AM  
 Creatinine 0.82 05/10/2018 09:24 AM  
 BUN/Creatinine ratio 13 05/10/2018 09:24 AM  
 GFR est AA >60 05/10/2018 09:24 AM  
 GFR est non-AA >60 05/10/2018 09:24 AM  
 Calcium 9.0 05/10/2018 09:24 AM  
 Bilirubin, total 0.5 05/10/2018 09:24 AM  
 AST (SGOT) 23 05/10/2018 09:24 AM  
 Alk. phosphatase 64 05/10/2018 09:24 AM  
 Protein, total 7.2 05/10/2018 09:24 AM  
 Albumin 4.3 05/10/2018 09:24 AM  
 Globulin 2.9 05/10/2018 09:24 AM  
 A-G Ratio 1.5 05/10/2018 09:24 AM  
 ALT (SGPT) 33 05/10/2018 09:24 AM  
 
Lab Results Component Value Date/Time TSH 1.16 05/10/2018 09:24 AM  
 
ASSESSMENT 1. Essential hypertension 2. Palpitations 3. Recurrent depression ([facility] 4. Hyperlipidemia, unspecified hyperlipidemia type PLAN Pharmacologic Management: Medications reviewed with the patient. Continue with  lisnoprilHCT 20/12.5 one tab daily. Orders Placed This Encounter  CBC WITH AUTOMATED DIFF  
 METABOLIC PANEL, BASIC  MAGNESIUM  
 AMB POC EKG ROUTINE W/ 12 LEADS, INTER & REP  
 EVENT MONITOR POST SYMPTOMS  varicella-zoster recombinant, PF, (SHINGRIX, PF,) 50 mcg/0.5 mL susr injection Patient will get a event monitor to review her heart rhythm during the symptom of palpitation. Health maintenance: Patient had her flu shot at work. Prescription written for varicella-zoster recombinant vaccine. Monitor BP carefully at home. Discussed DDx, follow-up & work-up. Discussed risk/benefit & side effect of treatment. Lipid lowering low salt diet, weight loss and graduated exercise. Allergy care. Follow up for regular OV, prn sooner. Health risk from non adherence discussed. Patient voiced understanding. Follow-up Disposition: 
Return in about 1 month (around 12/7/2018).   
 
Wesley Sepulveda MD

## 2018-11-08 ENCOUNTER — TELEPHONE (OUTPATIENT)
Dept: FAMILY MEDICINE CLINIC | Age: 58
End: 2018-11-08

## 2018-11-08 NOTE — TELEPHONE ENCOUNTER
Spoke to pt and made aware of the message, verbalized understanding.  Thank you  Syed Prabhakar, DORIS

## 2018-11-09 ENCOUNTER — DOCUMENTATION ONLY (OUTPATIENT)
Dept: FAMILY MEDICINE CLINIC | Age: 58
End: 2018-11-09

## 2018-11-14 DIAGNOSIS — R00.2 PALPITATIONS: Primary | ICD-10-CM

## 2018-11-14 DIAGNOSIS — I10 ESSENTIAL HYPERTENSION: ICD-10-CM

## 2018-12-13 ENCOUNTER — OFFICE VISIT (OUTPATIENT)
Dept: FAMILY MEDICINE CLINIC | Age: 58
End: 2018-12-13

## 2018-12-13 VITALS
OXYGEN SATURATION: 98 % | WEIGHT: 160.4 LBS | BODY MASS INDEX: 26.73 KG/M2 | TEMPERATURE: 99.4 F | RESPIRATION RATE: 16 BRPM | HEART RATE: 58 BPM | DIASTOLIC BLOOD PRESSURE: 76 MMHG | SYSTOLIC BLOOD PRESSURE: 118 MMHG | HEIGHT: 65 IN

## 2018-12-13 DIAGNOSIS — R00.2 PALPITATIONS: ICD-10-CM

## 2018-12-13 DIAGNOSIS — I10 ESSENTIAL HYPERTENSION: Primary | ICD-10-CM

## 2018-12-13 DIAGNOSIS — H69.83 EUSTACHIAN TUBE DYSFUNCTION, BILATERAL: ICD-10-CM

## 2018-12-13 RX ORDER — LISINOPRIL AND HYDROCHLOROTHIAZIDE 12.5; 2 MG/1; MG/1
TABLET ORAL
Qty: 45 TAB | Refills: 1 | Status: CANCELLED | OUTPATIENT
Start: 2018-12-13

## 2018-12-13 RX ORDER — LISINOPRIL AND HYDROCHLOROTHIAZIDE 12.5; 2 MG/1; MG/1
1 TABLET ORAL DAILY
Qty: 90 TAB | Refills: 3 | Status: SHIPPED | OUTPATIENT
Start: 2018-12-13 | End: 2019-10-21 | Stop reason: SDUPTHER

## 2018-12-13 RX ORDER — LISINOPRIL AND HYDROCHLOROTHIAZIDE 12.5; 2 MG/1; MG/1
1 TABLET ORAL DAILY
COMMUNITY
End: 2018-12-13 | Stop reason: SDUPTHER

## 2018-12-13 RX ORDER — TRAZODONE HYDROCHLORIDE 50 MG/1
TABLET ORAL
Qty: 90 TAB | Refills: 3 | Status: SHIPPED | OUTPATIENT
Start: 2018-12-13 | End: 2020-02-17

## 2018-12-13 NOTE — PROGRESS NOTES
SUBJECTIVE:    Chief Complaint   Patient presents with    Palpitations     follow up    Hypertension     HPI:     Her BP is being treated and controlled. She feels some \"flutter\" in her chest for a few seconds off and on for the last few months usually when she is under stress about 2-3 times a month. She denies any chest pain, lightheadedness, dyspnea. Last month she was sent for event monitor. She had the symptoms during the event monitor. However the event monitor did not  any significant rhythm problem. She has chronic allergic rhinitis with eustachian tube dysfunction. She has Rhinocort but she does not use it. Now for last several days her right ear feels congested with mild discomfort. It feels worse for last 1 day. No significant pain, chills, fever or malaise. Her depression/ anxiety is controlled. She is dieting for lipids and weight. Her elevated lipids treated with diet alone. Past Medical History:   Diagnosis Date    Acne     Breast nodule     right    Cervicalgia     Depression     Elevated LDL cholesterol level     Hypertension 1-1-2010    Multiple nevi     on skin    Uterine fibroid     with menorrhagia     Past Surgical History:   Procedure Laterality Date    HX TONSIL AND ADENOIDECTOMY       Current Outpatient Medications   Medication Sig    traZODone (DESYREL) 50 mg tablet TAKE ONE TABLET AT BEDTIME    lisinopril-hydroCHLOROthiazide (PRINZIDE, ZESTORETIC) 20-12.5 mg per tablet Take 1 Tab by mouth daily.  OTHER daily as needed. Alkalol nasal rinse. Alternate with the Rhinacort.  budesonide (RHINOCORT AQUA) 32 mcg/actuation nasal spray INSTILL 2 SPRAYS INTO EACH NOSTRIL EVERY NIGHT AT BEDTIME    aspirin (ASPIRIN) 325 mg tablet Take 325 mg by mouth two (2) times daily as needed.  multivitamin (ONE A DAY) tablet Take 1 Tab by mouth daily. No current facility-administered medications for this visit. Not using Rhinocort.     Allergies: No known allergy to drugs. ROS:   Constitutional: No fever, chills, night sweats, malaise, dizziness. Ear/Nose/Throat: Right ear congestion as above. No other acute complaints. Cardiovascular: No angina, PND, orthopnea, lightheadedness, edema, claudication. Respiratory: No dyspnea, wheeze, pleurisy, hemoptysis, unusual sputum. Gastrointestinal: No nausea/ vomiting, bowel habit change, pain, PAU symptoms, melena, hematochezia, anorexia. Psychiatric: No agitation, confusion/disorientation, suicidal or homicidal ideation. Musculoskeletal: No focal weakness, stiffness/rigidity, radicular pain. Skin: no other problems. OBJECTIVE  Constitutional: General Appearance:  well developed, well nourished, nontoxic, in no acute distress. Visit Vitals  /76 (BP 1 Location: Left arm, BP Patient Position: Sitting)   Pulse (!) 58   Temp 99.4 °F (37.4 °C) (Temporal)   Resp 16   Ht 5' 5\" (1.651 m)   Wt 160 lb 6.4 oz (72.8 kg)   SpO2 98%   BMI 26.69 kg/m²     Otoscopic Examination: external auditory canals are clear; tympanic membranes are dull, right greater than left. Nasal Cavity: mildly swollen mucosa & turbinates. Max[place] are not tender w/o redness or heat. Throat: clear tonsils, oropharynx, posterior pharynx. Pulmonary: Respiratory effort: normal; no dyspnea, no retractions, no accessory muscle use. Auscultation: normal & symmetrical air exchange; no rales, no rhonchi, no wheeze; no rubs    Cardiovascular: Palpation: PMI not displaced or enlarged, no thrills or heaves. Auscultation: RRR; no murmur, rubs or gallops. Extremities: no edema, no active varicosity. Gastrointestinal: Normal bowel sounds. No masses; no tenderness; no rebound/rigidity; no CVA tenderness. No hepatosplenomegaly. Psychiatric[de-identified] Oriented to time, place and person. Normal mood, no agitation or anxiety. Normal affect. Pleasant and cooperative. Musculoskeletal[de-identified] NC/ AT, neck supple.     Neurological[de-identified] CN I to XII: intact. DTR: symmetrical & normal.    Lab Results   Component Value Date/Time    Vitamin D 25-Hydroxy 36.1 03/23/2017 08:35 AM         Lab Results   Component Value Date/Time    WBC 7.0 11/07/2018 09:35 AM    HGB 14.2 11/07/2018 09:35 AM    HCT 42.9 11/07/2018 09:35 AM    PLATELET 753 32/81/8611 09:35 AM    MCV 97.7 (H) 11/07/2018 09:35 AM     Lab Results   Component Value Date/Time    Cholesterol, total 201 (H) 05/10/2018 09:24 AM    HDL Cholesterol 51 05/10/2018 09:24 AM    LDL, calculated 124.8 (H) 05/10/2018 09:24 AM    VLDL, calculated 25.2 05/10/2018 09:24 AM    Triglyceride 126 05/10/2018 09:24 AM    CHOL/HDL Ratio 3.9 05/10/2018 09:24 AM     Lab Results   Component Value Date/Time    Sodium 142 11/07/2018 09:35 AM    Potassium 4.0 11/07/2018 09:35 AM    Chloride 105 11/07/2018 09:35 AM    CO2 30 11/07/2018 09:35 AM    Anion gap 7 11/07/2018 09:35 AM    Glucose 96 11/07/2018 09:35 AM    BUN 13 11/07/2018 09:35 AM    Creatinine 0.75 11/07/2018 09:35 AM    BUN/Creatinine ratio 17 11/07/2018 09:35 AM    GFR est AA >60 11/07/2018 09:35 AM    GFR est non-AA >60 11/07/2018 09:35 AM    Calcium 9.4 11/07/2018 09:35 AM    Bilirubin, total 0.5 05/10/2018 09:24 AM    AST (SGOT) 23 05/10/2018 09:24 AM    Alk. phosphatase 64 05/10/2018 09:24 AM    Protein, total 7.2 05/10/2018 09:24 AM    Albumin 4.3 05/10/2018 09:24 AM    Globulin 2.9 05/10/2018 09:24 AM    A-G Ratio 1.5 05/10/2018 09:24 AM    ALT (SGPT) 33 05/10/2018 09:24 AM     Lab Results   Component Value Date/Time    TSH 1.16 05/10/2018 09:24 AM       CARDIAC EVENT MONITOR STUDY 11/19/18:  No significant arrhythmia. ASSESSMENT    Symptom of palpitation without any arrhythmia. 1. Essential hypertension    2. Palpitations    3. Eustachian tube dysfunction, bilateral        PLAN    Pharmacologic Management: Medications reviewed with the patient. Continue with  lisnoprilHCT 20/12.5 one tab daily. Back on Rhinocort.     Orders Placed This Encounter    traZODone (DESYREL) 50 mg tablet    lisinopril-hydroCHLOROthiazide (PRINZIDE, ZESTORETIC) 20-12.5 mg per tablet     Health maintenance: Patient had her flu shot at work. Prescription was written for varicella-zoster recombinant vaccine. Monitor BP carefully at home. In light of no objective evidence of cardiac cause of palpitations and patient has no concern at this time, we will continue to watch. Discussed DDx, follow-up & work-up. Discussed risk/benefit & side effect of treatment. Lipid lowering low salt diet, weight loss and graduated exercise. Allergy care. Follow up for regular OV, prn sooner. Health risk from non adherence discussed. Patient voiced understanding. Follow-up Disposition:  Return in about 3 months (around 3/13/2019).      Tere Swan MD

## 2018-12-13 NOTE — PROGRESS NOTES
Tyler Mitchell is a 63 y.o. female (: 1960) presenting to address: Follow-up  Right Ear ache x 1 day    Last office visit: 2018       Coordination of Care Questionnaire:   1. Have you been to the ER, urgent care clinic since your last visit? Hospitalized since your last visit? NO    2. Have you seen or consulted any other health care providers outside of the [address] [place] [address] since your last visit? Include any pap smears or colon screening. No    3. Do you have any medications requiring a refill? YES    4. Have you had anything to eat or drink since midnight?                 YES

## 2019-04-03 ENCOUNTER — OFFICE VISIT (OUTPATIENT)
Dept: FAMILY MEDICINE CLINIC | Age: 59
End: 2019-04-03

## 2019-04-03 VITALS
BODY MASS INDEX: 26.59 KG/M2 | WEIGHT: 159.6 LBS | DIASTOLIC BLOOD PRESSURE: 86 MMHG | HEIGHT: 65 IN | OXYGEN SATURATION: 96 % | SYSTOLIC BLOOD PRESSURE: 128 MMHG | RESPIRATION RATE: 17 BRPM | TEMPERATURE: 97.5 F | HEART RATE: 60 BPM

## 2019-04-03 DIAGNOSIS — R00.2 PALPITATIONS: ICD-10-CM

## 2019-04-03 DIAGNOSIS — F33.9 RECURRENT DEPRESSION (HCC): ICD-10-CM

## 2019-04-03 DIAGNOSIS — H69.83 EUSTACHIAN TUBE DYSFUNCTION, BILATERAL: ICD-10-CM

## 2019-04-03 DIAGNOSIS — I10 ESSENTIAL HYPERTENSION: Primary | ICD-10-CM

## 2019-04-03 NOTE — PROGRESS NOTES
Buck Hartman is a 63 y.o. female presents in office for f/u. Health Maintenance Due Topic Date Due  
 PAP AKA CERVICAL CYTOLOGY  05/24/2019 1. Have you been to the ER, urgent care clinic since your last visit? Hospitalized since your last visit? No 
 
2. Have you seen or consulted any other health care providers outside of the [address] [place] [address] since your last visit? Include any pap smears or colon screening.  No

## 2019-04-03 NOTE — PROGRESS NOTES
SUBJECTIVE: 
 
Chief Complaint Patient presents with  Hypertension f/u  Palpitations f/u HPI:  
 
Her BP is being treated and controlled. She felt some \"flutter\" in her chest for a few seconds off and on for few months usually when she is under stress about 2-3 times a month, until 3 months ago. She denies any chest pain, lightheadedness, dyspnea. 4 months ago an event monitor was placed. She had the symptoms during the event monitor. However the event monitor did not  any significant rhythm problem. She has not had any significant symptoms since. She has chronic allergic rhinitis with eustachian tube dysfunction. She is using Rhinocort; but in AM.  The pressure sensation in the ears is mostly controlled. No significant pain, chills, fever or malaise. Her depression/ anxiety is controlled with the trazodone in HS alone. She is dieting for lipids and weight. Her elevated lipids treated with diet alone. Past Medical History:  
Diagnosis Date  Acne  Breast nodule   
 right  Cervicalgia  Depression  Elevated LDL cholesterol level  Hypertension 1-1-2010  Multiple nevi   
 on skin  Uterine fibroid   
 with menorrhagia Past Surgical History:  
Procedure Laterality Date  HX TONSIL AND ADENOIDECTOMY Current Outpatient Medications Medication Sig  
 traZODone (DESYREL) 50 mg tablet TAKE ONE TABLET AT BEDTIME  lisinopril-hydroCHLOROthiazide (PRINZIDE, ZESTORETIC) 20-12.5 mg per tablet Take 1 Tab by mouth daily.  OTHER daily as needed. Alkalol nasal rinse. Alternate with the Rhinacort.  budesonide (RHINOCORT AQUA) 32 mcg/actuation nasal spray INSTILL 2 SPRAYS INTO EACH NOSTRIL EVERY NIGHT AT BEDTIME  aspirin (ASPIRIN) 325 mg tablet Take 325 mg by mouth two (2) times daily as needed.  multivitamin (ONE A DAY) tablet Take 1 Tab by mouth daily. No current facility-administered medications for this visit. Allergies: No known allergy to drugs. ROS:  
Constitutional: No fever, chills, night sweats, malaise, dizziness. Ear/Nose/Throat: Right ear congestion as above. No other acute complaints. Cardiovascular: No angina, PND, orthopnea, lightheadedness, edema, claudication. Respiratory: No dyspnea, wheeze, pleurisy, hemoptysis, unusual sputum. Gastrointestinal: No nausea/ vomiting, bowel habit change, pain, PAU symptoms, melena, hematochezia, anorexia. Psychiatric: No agitation, confusion/disorientation, suicidal or homicidal ideation. Musculoskeletal: No focal weakness, stiffness/rigidity, radicular pain. Skin: no other problems. OBJECTIVE Constitutional: General Appearance:  well developed, well nourished, nontoxic, in no acute distress. Visit Vitals /86 (BP 1 Location: Left arm, BP Patient Position: Sitting) Pulse 60 Temp 97.5 °F (36.4 °C) (Oral) Resp 17 Ht 5' 5\" (1.651 m) Wt 159 lb 9.6 oz (72.4 kg) SpO2 96% BMI 26.56 kg/m² Otoscopic Examination: external auditory canals are clear; tympanic membranes are dull, right greater than left. Nasal Cavity: mildly swollen mucosa & turbinates. Maxillas are not tender w/o redness or heat. Throat: clear tonsils, oropharynx, posterior pharynx. Pulmonary: Respiratory effort: normal; no dyspnea, no retractions, no accessory muscle use. Auscultation: normal & symmetrical air exchange; no rales, no rhonchi, no wheeze; no rubs Cardiovascular: Palpation: PMI not displaced or enlarged, no thrills or heaves. Auscultation: RRR; no murmur, rubs or gallops. Extremities: no edema, no active varicosity. Gastrointestinal: Normal bowel sounds. No masses; no tenderness; no rebound/rigidity; no CVA tenderness. No hepatosplenomegaly. Psychiatric[de-identified] Oriented to time, place and person. Normal mood, no agitation or anxiety. Normal affect. Pleasant and cooperative. Musculoskeletal[de-identified] NC/ AT, neck supple. Neurological[de-identified] CN I to XII: intact. DTR: symmetrical & normal. 
 
Lab Results Component Value Date/Time Vitamin D 25-Hydroxy 36.1 03/23/2017 08:35 AM  
   
Lab Results Component Value Date/Time WBC 7.0 11/07/2018 09:35 AM  
 HGB 14.2 11/07/2018 09:35 AM  
 HCT 42.9 11/07/2018 09:35 AM  
 PLATELET 432 68/30/2978 09:35 AM  
 MCV 97.7 (H) 11/07/2018 09:35 AM  
 
Lab Results Component Value Date/Time Cholesterol, total 201 (H) 05/10/2018 09:24 AM  
 HDL Cholesterol 51 05/10/2018 09:24 AM  
 LDL, calculated 124.8 (H) 05/10/2018 09:24 AM  
 VLDL, calculated 25.2 05/10/2018 09:24 AM  
 Triglyceride 126 05/10/2018 09:24 AM  
 CHOL/HDL Ratio 3.9 05/10/2018 09:24 AM  
 
Lab Results Component Value Date/Time Sodium 142 11/07/2018 09:35 AM  
 Potassium 4.0 11/07/2018 09:35 AM  
 Chloride 105 11/07/2018 09:35 AM  
 CO2 30 11/07/2018 09:35 AM  
 Anion gap 7 11/07/2018 09:35 AM  
 Glucose 96 11/07/2018 09:35 AM  
 BUN 13 11/07/2018 09:35 AM  
 Creatinine 0.75 11/07/2018 09:35 AM  
 BUN/Creatinine ratio 17 11/07/2018 09:35 AM  
 GFR est AA >60 11/07/2018 09:35 AM  
 GFR est non-AA >60 11/07/2018 09:35 AM  
 Calcium 9.4 11/07/2018 09:35 AM  
 Bilirubin, total 0.5 05/10/2018 09:24 AM  
 AST (SGOT) 23 05/10/2018 09:24 AM  
 Alk. phosphatase 64 05/10/2018 09:24 AM  
 Protein, total 7.2 05/10/2018 09:24 AM  
 Albumin 4.3 05/10/2018 09:24 AM  
 Globulin 2.9 05/10/2018 09:24 AM  
 A-G Ratio 1.5 05/10/2018 09:24 AM  
 ALT (SGPT) 33 05/10/2018 09:24 AM  
 
Lab Results Component Value Date/Time TSH 1.16 05/10/2018 09:24 AM  
 
 
CARDIAC EVENT MONITOR STUDY 11/19/18: No significant arrhythmia. ASSESSMENT: 
 
1. Essential hypertension 2. Palpitations 3. Eustachian tube dysfunction, bilateral   
4. [facility] PLAN Pharmacologic Management: Medications reviewed with the patient. Continue with  lisnoprilHCT 20/12.5 one tab daily. Back on Rhinocort in PM. Health maintenance: Patient had her flu shot at work. Prescription was written for varicella-zoster recombinant vaccine. Monitor BP carefully at home. In light of no objective evidence of cardiac cause of palpitations and patient has no concern at this time, we will continue to watch. Discussed DDx, follow-up & work-up. Discussed risk/benefit & side effect of treatment. Lipid lowering low salt diet, weight loss and graduated exercise. Allergy care. Follow up for regular OV, prn sooner. Health risk from non adherence discussed. Patient voiced understanding. Follow-up and Dispositions · Return in about 3 months (around 7/3/2019) for fasting, GYN. Perez Ferguson MD

## 2019-07-15 ENCOUNTER — HOSPITAL ENCOUNTER (OUTPATIENT)
Dept: LAB | Age: 59
Discharge: HOME | End: 2019-07-15
Payer: COMMERCIAL

## 2019-07-15 ENCOUNTER — OFFICE VISIT (OUTPATIENT)
Dept: FAMILY MEDICINE CLINIC | Age: 59
End: 2019-07-15

## 2019-07-15 VITALS
OXYGEN SATURATION: 97 % | DIASTOLIC BLOOD PRESSURE: 72 MMHG | RESPIRATION RATE: 16 BRPM | BODY MASS INDEX: 26.11 KG/M2 | TEMPERATURE: 98.5 F | HEART RATE: 52 BPM | HEIGHT: 65 IN | WEIGHT: 156.7 LBS | SYSTOLIC BLOOD PRESSURE: 124 MMHG

## 2019-07-15 DIAGNOSIS — Z00.00 ROUTINE GENERAL MEDICAL EXAMINATION AT A HEALTH CARE FACILITY: Primary | ICD-10-CM

## 2019-07-15 DIAGNOSIS — N63.20 MASS OF BREAST, LEFT: ICD-10-CM

## 2019-07-15 DIAGNOSIS — F33.9 RECURRENT DEPRESSION (HCC): ICD-10-CM

## 2019-07-15 DIAGNOSIS — I10 ESSENTIAL HYPERTENSION: ICD-10-CM

## 2019-07-15 DIAGNOSIS — E78.5 HYPERLIPIDEMIA, UNSPECIFIED HYPERLIPIDEMIA TYPE: ICD-10-CM

## 2019-07-15 DIAGNOSIS — R00.2 PALPITATIONS: ICD-10-CM

## 2019-07-15 DIAGNOSIS — Z00.00 ROUTINE GENERAL MEDICAL EXAMINATION AT A HEALTH CARE FACILITY: ICD-10-CM

## 2019-07-15 DIAGNOSIS — Z12.4 PAP SMEAR FOR CERVICAL CANCER SCREENING: ICD-10-CM

## 2019-07-15 LAB
ALBUMIN SERPL-MCNC: 4.1 G/DL (ref 3.4–5)
ALBUMIN/GLOB SERPL: 1.6 {RATIO} (ref 0.8–1.7)
ALP SERPL-CCNC: 65 U/L (ref 45–117)
ALT SERPL-CCNC: 28 U/L (ref 13–56)
ANION GAP SERPL CALC-SCNC: 5 MMOL/L (ref 3–18)
APPEARANCE UR: NORMAL
AST SERPL-CCNC: 25 U/L (ref 15–37)
BASOPHILS # BLD: 0.1 K/UL (ref 0–0.1)
BASOPHILS NFR BLD: 1 % (ref 0–2)
BILIRUB SERPL-MCNC: 0.5 MG/DL (ref 0.2–1)
BILIRUB UR QL: NEGATIVE
BUN SERPL-MCNC: 14 MG/DL (ref 7–18)
BUN/CREAT SERPL: 15 (ref 12–20)
CALCIUM SERPL-MCNC: 9 MG/DL (ref 8.5–10.1)
CHLORIDE SERPL-SCNC: 107 MMOL/L (ref 100–108)
CHOLEST SERPL-MCNC: 203 MG/DL
CO2 SERPL-SCNC: 31 MMOL/L (ref 21–32)
COLOR UR: YELLOW
CREAT SERPL-MCNC: 0.91 MG/DL (ref 0.6–1.3)
DIFFERENTIAL METHOD BLD: ABNORMAL
EOSINOPHIL # BLD: 0.1 K/UL (ref 0–0.4)
EOSINOPHIL NFR BLD: 2 % (ref 0–5)
ERYTHROCYTE [DISTWIDTH] IN BLOOD BY AUTOMATED COUNT: 11.9 % (ref 11.6–14.5)
GLOBULIN SER CALC-MCNC: 2.5 G/DL (ref 2–4)
GLUCOSE SERPL-MCNC: 91 MG/DL (ref 74–99)
GLUCOSE UR STRIP.AUTO-MCNC: NEGATIVE MG/DL
HCT VFR BLD AUTO: 41.9 % (ref 35–45)
HDLC SERPL-MCNC: 56 MG/DL (ref 40–60)
HDLC SERPL: 3.6 {RATIO} (ref 0–5)
HGB BLD-MCNC: 13.7 G/DL (ref 12–16)
HGB UR QL STRIP: NEGATIVE
KETONES UR QL STRIP.AUTO: NEGATIVE MG/DL
LDLC SERPL CALC-MCNC: 126.2 MG/DL (ref 0–100)
LEUKOCYTE ESTERASE UR QL STRIP.AUTO: NEGATIVE
LIPID PROFILE,FLP: ABNORMAL
LYMPHOCYTES # BLD: 2.3 K/UL (ref 0.9–3.6)
LYMPHOCYTES NFR BLD: 34 % (ref 21–52)
MCH RBC QN AUTO: 31.9 PG (ref 24–34)
MCHC RBC AUTO-ENTMCNC: 32.7 G/DL (ref 31–37)
MCV RBC AUTO: 97.7 FL (ref 74–97)
MONOCYTES # BLD: 0.5 K/UL (ref 0.05–1.2)
MONOCYTES NFR BLD: 7 % (ref 3–10)
NEUTS SEG # BLD: 3.8 K/UL (ref 1.8–8)
NEUTS SEG NFR BLD: 56 % (ref 40–73)
NITRITE UR QL STRIP.AUTO: NEGATIVE
PH UR STRIP: 8 [PH] (ref 5–8)
PLATELET # BLD AUTO: 231 K/UL (ref 135–420)
PMV BLD AUTO: 10.3 FL (ref 9.2–11.8)
POTASSIUM SERPL-SCNC: 4.3 MMOL/L (ref 3.5–5.5)
PROT SERPL-MCNC: 6.6 G/DL (ref 6.4–8.2)
PROT UR STRIP-MCNC: NEGATIVE MG/DL
RBC # BLD AUTO: 4.29 M/UL (ref 4.2–5.3)
SODIUM SERPL-SCNC: 143 MMOL/L (ref 136–145)
SP GR UR REFRACTOMETRY: 1.02 (ref 1–1.03)
TRIGL SERPL-MCNC: 104 MG/DL (ref ?–150)
TSH SERPL DL<=0.05 MIU/L-ACNC: 1.08 UIU/ML (ref 0.36–3.74)
UROBILINOGEN UR QL STRIP.AUTO: 0.2 EU/DL (ref 0.2–1)
VLDLC SERPL CALC-MCNC: 20.8 MG/DL
WBC # BLD AUTO: 6.7 K/UL (ref 4.6–13.2)

## 2019-07-15 PROCEDURE — 88142 CYTOPATH C/V THIN LAYER: CPT

## 2019-07-15 PROCEDURE — 84443 ASSAY THYROID STIM HORMONE: CPT

## 2019-07-15 PROCEDURE — 80053 COMPREHEN METABOLIC PANEL: CPT

## 2019-07-15 PROCEDURE — 36415 COLL VENOUS BLD VENIPUNCTURE: CPT

## 2019-07-15 PROCEDURE — 80061 LIPID PANEL: CPT

## 2019-07-15 PROCEDURE — 81003 URINALYSIS AUTO W/O SCOPE: CPT

## 2019-07-15 PROCEDURE — 82306 VITAMIN D 25 HYDROXY: CPT

## 2019-07-15 PROCEDURE — 85025 COMPLETE CBC W/AUTO DIFF WBC: CPT

## 2019-07-15 NOTE — PROGRESS NOTES
SUBJECTIVE:  Katia Hardin is a 63y.o. year old female  Chief Complaint   Patient presents with    Hypertension    Gyn Exam       History of Present Illness:   Patient is here for routine GYN exam.  She denies any new complaints. She does not have any Gyn,  or breast complaints. Her BP is being treated and controlled. She felt some \"flutter\" in her chest for a few seconds off and on for few months usually when she is under stress. She denies any chest pain, lightheadedness, dyspnea. 4 months ago an event monitor was placed. She had the symptoms during the event monitor. However the event monitor did not  any significant rhythm problem. She has occasional symptoms since. She denies any associated hemodynamic symptoms or respiratory symptoms. Her depression/ anxiety is controlled with the trazodone in HS alone. She is dieting for lipids and weight. Her elevated lipids treated with diet alone. Past Medical History:   Diagnosis Date    Acne     Breast nodule     right    Cervicalgia     Depression     Elevated LDL cholesterol level     Hypertension 1-1-2010    Multiple nevi     on skin    Uterine fibroid     with menorrhagia     Past Surgical History:   Procedure Laterality Date    HX TONSIL AND ADENOIDECTOMY          Current Outpatient Medications   Medication Sig    traZODone (DESYREL) 50 mg tablet TAKE ONE TABLET AT BEDTIME    lisinopril-hydroCHLOROthiazide (PRINZIDE, ZESTORETIC) 20-12.5 mg per tablet Take 1 Tab by mouth daily.  OTHER daily as needed. Alkalol nasal rinse. Alternate with the Rhinacort.  budesonide (RHINOCORT AQUA) 32 mcg/actuation nasal spray INSTILL 2 SPRAYS INTO EACH NOSTRIL EVERY NIGHT AT BEDTIME    aspirin (ASPIRIN) 325 mg tablet Take 325 mg by mouth two (2) times daily as needed.  multivitamin (ONE A DAY) tablet Take 1 Tab by mouth daily. No current facility-administered medications for this visit.         No Known Allergies       Review of Systems:   Constitutional: No fever, chills, night sweats, malaise, dizziness. Cardiovascular: No angina, palpitations, PND, orthopnea, lightheadedness, edema, claudication. Respiratory: No dyspnea, wheeze, pleurisy, hemoptysis, unusual cough or sputum. Gastrointestinal: No nausea/ vomiting, bowel habit change, pain, PAU symptoms, melena, hematochezia, anorexia. Gyn: No vaginal unusual bleeding/ discharge/ discomfort, no pelvic pain, dysmenorrhea. Patient's last menstrual period was 06/22/2015 (lmp unknown). .   Genitourinary: No dysuria, urgency, frequency, nocturia, hematuria, hesitancy, incontinence. Psychiatric: No agitation, confusion/disorientation, suicidal or homicidal ideation. OBJECTIVE:  Physical Exam:   Constitutional: General Appearance:  well developed, well nourished, nontoxic, in no acute distress. Visit Vitals  /72 (BP 1 Location: Left arm, BP Patient Position: Sitting)   Pulse (!) 52   Temp 98.5 °F (36.9 °C) (Oral)   Resp 16   Ht 5' 5\" (1.651 m)   Wt 156 lb 11.2 oz (71.1 kg)   SpO2 97%   BMI 26.08 kg/m²     Pulmonary: Respiratory effort: normal; no dyspnea, no retractions, no accessory muscle use. Auscultation: normal & symmetrical air exchange; no rales, no rhonchi, no wheeze; no rubs. Cardiovascular: Palpation: PMI not displaced or enlarged, no thrills or heaves. Auscultation: RRR; no murmur, rubs or gallops. Extremities: no edema, no active varicosity. Neck: carotid arteries- normal volume & without bruit; no JVD. Gastrointestinal: Normal bowel sounds. No masses; no tenderness; no rebound/rigidity; no CVA tenderness. No hepatosplenomegaly. No inguinal, ventral or umbilical hernias. Skin: no acute rash. Psychiatric[de-identified] Oriented to time, place and person. Normal mood, no agitation or anxiety. Normal affect. Pleasant and cooperative. .    Musculoskeletal: NC/AT. Neck-supple. Neurological[de-identified] CN I to XII: intact.   DTR: symmetrical & normal.    External genitalia: normal labia & hair, no lesions. Vagina: normal appearance, no lesions, minimal discharge, normal pelvic support. Urethra: no masses, tenderness or scars. Bladder: no fullnesses, no tenderness, no palpable masses. Cervix: normal appearance, no lesions or discharge. Uterus: normal size, mobility; no descent; no tenderness. Adnexa - no masses, tenderness, nodularity or organomegaly. Breast-Inspection: no assymmetry, nipple discharge, retractions or skin changes. Normal nipples and areola. Palpation: No masses, lumps or tenderness in the breasts or axillae. Lab Results   Component Value Date/Time    WBC 7.0 11/07/2018 09:35 AM    HGB 14.2 11/07/2018 09:35 AM    HCT 42.9 11/07/2018 09:35 AM    PLATELET 772 86/54/3855 09:35 AM    MCV 97.7 (H) 11/07/2018 09:35 AM     Lab Results   Component Value Date/Time    Sodium 142 11/07/2018 09:35 AM    Potassium 4.0 11/07/2018 09:35 AM    Chloride 105 11/07/2018 09:35 AM    CO2 30 11/07/2018 09:35 AM    Anion gap 7 11/07/2018 09:35 AM    Glucose 96 11/07/2018 09:35 AM    BUN 13 11/07/2018 09:35 AM    Creatinine 0.75 11/07/2018 09:35 AM    BUN/Creatinine ratio 17 11/07/2018 09:35 AM    GFR est AA >60 11/07/2018 09:35 AM    GFR est non-AA >60 11/07/2018 09:35 AM    Calcium 9.4 11/07/2018 09:35 AM    Bilirubin, total 0.5 05/10/2018 09:24 AM    AST (SGOT) 23 05/10/2018 09:24 AM    Alk.  phosphatase 64 05/10/2018 09:24 AM    Protein, total 7.2 05/10/2018 09:24 AM    Albumin 4.3 05/10/2018 09:24 AM    Globulin 2.9 05/10/2018 09:24 AM    A-G Ratio 1.5 05/10/2018 09:24 AM    ALT (SGPT) 33 05/10/2018 09:24 AM     Lab Results   Component Value Date/Time    TSH 1.16 05/10/2018 09:24 AM    T4, Free 1.1 03/23/2017 08:35 AM     Lab Results   Component Value Date/Time    Cholesterol, total 201 (H) 05/10/2018 09:24 AM    HDL Cholesterol 51 05/10/2018 09:24 AM    LDL, calculated 124.8 (H) 05/10/2018 09:24 AM    VLDL, calculated 25.2 05/10/2018 09:24 AM Triglyceride 126 05/10/2018 09:24 AM    CHOL/HDL Ratio 3.9 05/10/2018 09:24 AM     Lab Results   Component Value Date/Time    Vitamin D 25-Hydroxy 36.1 03/23/2017 08:35 AM       Nuclear Stress test 2/10/2012  OVERALL IMPRESSION:  1. Normal left ventricular ejection fraction with normal wall motion. 2. No scintigraphic evidence for infarct or ischemia. Halter 11/19/2018  No significant arrhythmia. ASSESSMENT:     1. Essential hypertension    2. Palpitations    3. Recurrent depression ([facility] [facility]    4. Hyperlipidemia, unspecified hyperlipidemia type    5. Pap smear for cervical cancer screening    6. Routine general medical examination at a health care facility        PLAN:       Orders Placed This Encounter    SLIME MAMMO LT DX INCL CAD    US BREAST LT LIMITED=<3 QUAD    CBC WITH AUTOMATED DIFF    METABOLIC PANEL, COMPREHENSIVE    LIPID PANEL    TSH 3RD GENERATION    URINALYSIS W/ RFLX MICROSCOPIC    VITAMIN D, 25 HYDROXY    PAP LB, RFX HPV ALL MDP([id number])     The patient to consider stress test and echocardiogram.  She had a normal nuclear stress test about 7 years ago    Discussed DDx, follow-up & work-up. Follow up visit as planned, prn sooner. PRN to ED. Low salt ADA weight lowering diet and exercise as tolerated. Gyn and breast care discussed. Health risk from non adherence discussed. Patient voiced understanding. Follow-up and Dispositions    · Return in about 2 months (around 9/15/2019).          Rocio Gallegos MD

## 2019-07-15 NOTE — PROGRESS NOTES
1. Have you been to the ER, urgent care clinic or hospitalized since your last visit? NO.     2. Have you seen or consulted any other health care providers outside of the [address] [place] [address] since your last visit (Include any pap smears or colon screening)? NO      Do you have an Advanced Directive? YES    Would you like information on Advanced Directives?  NO    Health Maintenance Due   Topic Date Due    PAP AKA CERVICAL CYTOLOGY  05/24/2019    Pap Smear to be completed today

## 2019-07-16 LAB — 25(OH)D3 SERPL-MCNC: 43.9 NG/ML (ref 30–100)

## 2019-07-16 NOTE — PROGRESS NOTES
Urine analysis, complete blood count, metabolic panel, thyroid enzyme and vitamin D were good. LDL cholesterol is still a little high, no change in a year.

## 2019-07-17 NOTE — PROGRESS NOTES
Pap smear was satisfactory for evaluation. It was negative for intraepithelial lesion or malignancy good. We will discuss further at next office visit.

## 2019-07-30 DIAGNOSIS — N63.20 MASS OF BREAST, LEFT: ICD-10-CM

## 2019-10-21 RX ORDER — LISINOPRIL AND HYDROCHLOROTHIAZIDE 12.5; 2 MG/1; MG/1
TABLET ORAL
Qty: 90 TAB | Refills: 4 | Status: SHIPPED | OUTPATIENT
Start: 2019-10-21 | End: 2020-10-28

## 2019-11-21 ENCOUNTER — OFFICE VISIT (OUTPATIENT)
Dept: FAMILY MEDICINE CLINIC | Age: 59
End: 2019-11-21

## 2019-11-21 VITALS
OXYGEN SATURATION: 98 % | HEIGHT: 65 IN | SYSTOLIC BLOOD PRESSURE: 128 MMHG | HEART RATE: 66 BPM | DIASTOLIC BLOOD PRESSURE: 70 MMHG | RESPIRATION RATE: 18 BRPM | BODY MASS INDEX: 26.82 KG/M2 | TEMPERATURE: 97.1 F | WEIGHT: 161 LBS

## 2019-11-21 DIAGNOSIS — F33.9 RECURRENT DEPRESSION (HCC): ICD-10-CM

## 2019-11-21 DIAGNOSIS — I10 ESSENTIAL HYPERTENSION: Primary | ICD-10-CM

## 2019-11-21 DIAGNOSIS — E78.5 HYPERLIPIDEMIA, UNSPECIFIED HYPERLIPIDEMIA TYPE: ICD-10-CM

## 2019-11-21 NOTE — PROGRESS NOTES
Howie Vazquez presents today for   Chief Complaint   Patient presents with    Hypertension     f/u      Depression Screening:  3 most recent PHQ Screens 4/3/2019   PHQ Not Done -   Little interest or pleasure in doing things Not at all   Feeling down, depressed, irritable, or hopeless Not at all   Total Score PHQ 2 0     Learning Assessment:  Learning Assessment 6/27/2017   PRIMARY LEARNER Patient   HIGHEST LEVEL OF EDUCATION - PRIMARY LEARNER  > 4 YEARS [place] PRIMARY LEARNER NONE   CO-LEARNER CAREGIVER No   PRIMARY LANGUAGE ENGLISH   LEARNER PREFERENCE PRIMARY DEMONSTRATION     -   ANSWERED BY patient   RELATIONSHIP SELF     Abuse Screening:  Abuse Screening Questionnaire 4/3/2019   Do you ever feel afraid of your partner? N   Are you in a relationship with someone who physically or mentally threatens you? N   Is it safe for you to go home? Y     Fall Risk  Fall Risk Assessment, last 12 mths 4/3/2019   Able to walk? Yes   Fall in past 12 months? No   Fall with injury? -   Number of falls in past 12 months -   Fall Risk Score -     Health Maintenance reviewed and discussed and ordered per Provider  Health maintenance with due date reviewed with patient. Patient states she received her influenza vaccine in Oct. 2019 at [place] via [address]. Howie Vazquez is updated on all     Coordination of Care:  1. Have you been to the ER, urgent care clinic since your last visit? Hospitalized since your last visit? no  2. Have you seen or consulted any other health care providers outside of the [facility] since your last visit? Include any pap smears or colon screening.  no

## 2019-11-21 NOTE — PATIENT INSTRUCTIONS
Body Mass Index: Care Instructions Your Care Instructions Body mass index (BMI) can help you see if your weight is raising your risk for health problems. It uses a formula to compare how much you weigh with how tall you are. · A BMI lower than 18.5 is considered underweight. · A BMI between 18.5 and 24.9 is considered healthy. · A BMI between 25 and 29.9 is considered overweight. A BMI of 30 or higher is considered obese. If your BMI is in the normal range, it means that you have a lower risk for weight-related health problems. If your BMI is in the overweight or obese range, you may be at increased risk for weight-related health problems, such as high blood pressure, heart disease, stroke, arthritis or joint pain, and diabetes. If your BMI is in the underweight range, you may be at increased risk for health problems such as fatigue, lower protection (immunity) against illness, muscle loss, bone loss, hair loss, and hormone problems. BMI is just one measure of your risk for weight-related health problems. You may be at higher risk for health problems if you are not active, you eat an unhealthy diet, or you drink too much alcohol or use tobacco products. Follow-up care is a key part of your treatment and safety. Be sure to make and go to all appointments, and call your doctor if you are having problems. It's also a good idea to know your test results and keep a list of the medicines you take. How can you care for yourself at home? · Practice healthy eating habits. This includes eating plenty of fruits, vegetables, whole grains, lean protein, and low-fat dairy. · If your doctor recommends it, get more exercise. Walking is a good choice. Bit by bit, increase the amount you walk every day. Try for at least 30 minutes on most days of the week. · Do not smoke. Smoking can increase your risk for health problems.  If you need help quitting, talk to your doctor about stop-smoking programs and medicines. These can increase your chances of quitting for good. · Limit alcohol to 2 drinks a day for men and 1 drink a day for women. Too much alcohol can cause health problems. If you have a BMI higher than 25 · Your doctor may do other tests to check your risk for weight-related health problems. This may include measuring the distance around your waist. A waist measurement of more than 40 inches in men or 35 inches in women can increase the risk of weight-related health problems. · Talk with your doctor about steps you can take to stay healthy or improve your health. You may need to make lifestyle changes to lose weight and stay healthy, such as changing your diet and getting regular exercise. If you have a BMI lower than 18.5 · Your doctor may do other tests to check your risk for health problems. · Talk with your doctor about steps you can take to stay healthy or improve your health. You may need to make lifestyle changes to gain or maintain weight and stay healthy, such as getting more healthy foods in your diet and doing exercises to build muscle. Where can you learn more? Go to http://tee-sergio.info/. Enter S176 in the search box to learn more about \"Body Mass Index: Care Instructions. \" Current as of: October 13, 2016 Content Version: 11.4 © 2950-3409 [employer]. Care instructions adapted under license by [employer] (which disclaims liability or warranty for this information). If you have questions about a medical condition or this instruction, always ask your healthcare professional. [place] [address] any warranty or liability for your use of this information. Starting a Weight Loss Plan: Care Instructions Your Care Instructions If you are thinking about losing weight, it can be hard to know where to start.  Your doctor can help you set up a weight loss plan that best meets your needs. You may want to take a class on nutrition or exercise, or join a weight loss support group. If you have questions about how to make changes to your eating or exercise habits, ask your doctor about seeing a registered dietitian or an exercise specialist. 
It can be a big challenge to lose weight. But you do not have to make huge changes at once. Make small changes, and stick with them. When those changes become habit, add a few more changes. If you do not think you are ready to make changes right now, try to pick a date in the future. Make an appointment to see your doctor to discuss whether the time is right for you to start a plan. Follow-up care is a key part of your treatment and safety. Be sure to make and go to all appointments, and call your doctor if you are having problems. It's also a good idea to know your test results and keep a list of the medicines you take. How can you care for yourself at home? · Set realistic goals. Many people expect to lose much more weight than is likely. A weight loss of 5% to 10% of your body weight may be enough to improve your health. · Get family and friends involved to provide support. Talk to them about why you are trying to lose weight, and ask them to help. They can help by participating in exercise and having meals with you, even if they may be eating something different. · Find what works best for you. If you do not have time or do not like to cook, a program that offers meal replacement bars or shakes may be better for you. Or if you like to prepare meals, finding a plan that includes daily menus and recipes may be best. 
· Ask your doctor about other health professionals who can help you achieve your weight loss goals. ¨ A dietitian can help you make healthy changes in your diet.  
¨ An exercise specialist or  can help you develop a safe and effective exercise program. 
 ¨ A counselor or psychiatrist can help you cope with issues such as depression, anxiety, or family problems that can make it hard to focus on weight loss. · Consider joining a support group for people who are trying to lose weight. Your doctor can suggest groups in your area. Where can you learn more? Go to http://tee-sergio.info/. Enter H149 in the search box to learn more about \"Starting a Weight Loss Plan: Care Instructions. \" Current as of: October 13, 2016 Content Version: 11.4 © 4735-3372 [employer]. Care instructions adapted under license by [employer] (which disclaims liability or warranty for this information). If you have questions about a medical condition or this instruction, always ask your healthcare professional. [address] any warranty or liability for your use of this information.

## 2019-11-21 NOTE — PROGRESS NOTES
SUBJECTIVE:  Rinku Fang is a 63y.o. year old female  Chief Complaint   Patient presents with    Hypertension     f/u       History of Present Illness:     Her BP is being treated and controlled. No more \"flutter\" in her chest.  She denies any chest pain, lightheadedness, dyspnea. 4 months ago an event monitor was placed. Her depression/ anxiety is controlled with the trazodone in HS alone. She is dieting for lipids and weight. Her elevated lipids treated with diet alone. Past Medical History:   Diagnosis Date    Acne     Breast nodule     right    Cervicalgia     Depression     Elevated LDL cholesterol level     Hypertension 1-1-2010    Multiple nevi     on skin    Uterine fibroid     with menorrhagia     Past Surgical History:   Procedure Laterality Date    HX TONSIL AND ADENOIDECTOMY          Current Outpatient Medications   Medication Sig    lisinopril-hydroCHLOROthiazide (PRINZIDE, ZESTORETIC) 20-12.5 mg per tablet TAKE 1 TABLET DAILY    traZODone (DESYREL) 50 mg tablet TAKE ONE TABLET AT BEDTIME    OTHER daily as needed. Alkalol nasal rinse. Alternate with the Rhinacort.  budesonide (RHINOCORT AQUA) 32 mcg/actuation nasal spray INSTILL 2 SPRAYS INTO EACH NOSTRIL EVERY NIGHT AT BEDTIME    aspirin (ASPIRIN) 325 mg tablet Take 325 mg by mouth two (2) times daily as needed.  multivitamin (ONE A DAY) tablet Take 1 Tab by mouth daily. No current facility-administered medications for this visit. No Known Allergies       Review of Systems:   Constitutional: No fever, chills, night sweats, malaise, dizziness. Cardiovascular: No angina, palpitations, PND, orthopnea, lightheadedness, edema, claudication. Respiratory: No dyspnea, wheeze, pleurisy, hemoptysis, unusual cough or sputum. Gastrointestinal: No nausea/ vomiting, bowel habit change, pain, PAU symptoms, melena, hematochezia, anorexia.      Genitourinary: No dysuria, urgency, frequency, nocturia, hematuria, hesitancy, incontinence. Psychiatric: No agitation, confusion/disorientation, suicidal or homicidal ideation. OBJECTIVE:  Physical Exam:   Constitutional: General Appearance:  well developed, well nourished, nontoxic, in no acute distress. Visit Vitals  /70 (BP 1 Location: Left arm, BP Patient Position: Sitting)   Pulse 66   Temp 97.1 °F (36.2 °C) (Oral)   Resp 18   Ht 5' 5\" (1.651 m)   Wt 161 lb (73 kg)   SpO2 98%   BMI 26.79 kg/m²     Pulmonary: Respiratory effort: normal; no dyspnea, no retractions, no accessory muscle use. Auscultation: normal & symmetrical air exchange; no rales, no rhonchi, no wheeze; no rubs. Cardiovascular: Palpation: PMI not displaced or enlarged, no thrills or heaves. Auscultation: RRR; no murmur, rubs or gallops. Extremities: no edema, no active varicosity. Neck: carotid arteries- normal volume & without bruit; no JVD. Gastrointestinal: Normal bowel sounds. No masses; no tenderness; no rebound/rigidity; no CVA tenderness. No hepatosplenomegaly. Psychiatric[de-identified] Oriented to time, place and person. Normal mood, no agitation or anxiety. Normal affect. Pleasant and cooperative. .    Musculoskeletal: NC/AT. Neck-supple. Neurological[de-identified] CN I to XII: intact.   DTR: symmetrical & normal.    Lab Results   Component Value Date/Time    WBC 6.7 07/15/2019 09:16 AM    HGB 13.7 07/15/2019 09:16 AM    HCT 41.9 07/15/2019 09:16 AM    PLATELET 050 68/71/4002 09:16 AM    MCV 97.7 (H) 07/15/2019 09:16 AM     Lab Results   Component Value Date/Time    Sodium 143 07/15/2019 09:16 AM    Potassium 4.3 07/15/2019 09:16 AM    Chloride 107 07/15/2019 09:16 AM    CO2 31 07/15/2019 09:16 AM    Anion gap 5 07/15/2019 09:16 AM    Glucose 91 07/15/2019 09:16 AM    BUN 14 07/15/2019 09:16 AM    Creatinine 0.91 07/15/2019 09:16 AM    BUN/Creatinine ratio 15 07/15/2019 09:16 AM    GFR est AA >60 07/15/2019 09:16 AM    GFR est non-AA >60 07/15/2019 09:16 AM    Calcium 9.0 07/15/2019 09:16 AM    Bilirubin, total 0.5 07/15/2019 09:16 AM    AST (SGOT) 25 07/15/2019 09:16 AM    Alk. phosphatase 65 07/15/2019 09:16 AM    Protein, total 6.6 07/15/2019 09:16 AM    Albumin 4.1 07/15/2019 09:16 AM    Globulin 2.5 07/15/2019 09:16 AM    A-G Ratio 1.6 07/15/2019 09:16 AM    ALT (SGPT) 28 07/15/2019 09:16 AM     Lab Results   Component Value Date/Time    TSH 1.08 07/15/2019 09:16 AM    T4, Free 1.1 03/23/2017 08:35 AM     Lab Results   Component Value Date/Time    Cholesterol, total 203 (H) 07/15/2019 09:16 AM    HDL Cholesterol 56 07/15/2019 09:16 AM    LDL, calculated 126.2 (H) 07/15/2019 09:16 AM    VLDL, calculated 20.8 07/15/2019 09:16 AM    Triglyceride 104 07/15/2019 09:16 AM    CHOL/HDL Ratio 3.6 07/15/2019 09:16 AM     Lab Results   Component Value Date/Time    Vitamin D 25-Hydroxy 43.9 07/15/2019 09:16 AM           ASSESSMENT:     1. Essential hypertension    2. Hyperlipidemia, unspecified hyperlipidemia type    3. Recurrent depression ([address]        PLAN:     Discussed the patient's BMI with her. The BMI follow up plan is as follows:   -dietary management education, guidance, and counseling  -encourage exercise  -monitor weight prescribed dietary intake    Discussed DDx, follow-up & work-up. Follow up visit as planned, prn sooner. PRN to ED. Low salt ADA weight lowering diet and exercise as tolerated. Health risk from non adherence discussed. Patient voiced understanding. Health maintenance: Patient says she had a flu shot in the pharmacy. Follow-up and Dispositions    · Return in about 4 months (around 3/21/2020).          Suni Nicole MD

## 2020-09-10 ENCOUNTER — VIRTUAL VISIT (OUTPATIENT)
Dept: FAMILY MEDICINE CLINIC | Facility: CLINIC | Age: 60
End: 2020-09-10

## 2020-09-10 DIAGNOSIS — E78.5 HYPERLIPIDEMIA, UNSPECIFIED HYPERLIPIDEMIA TYPE: ICD-10-CM

## 2020-09-10 DIAGNOSIS — F33.9 RECURRENT DEPRESSION (HCC): ICD-10-CM

## 2020-09-10 DIAGNOSIS — E55.9 VITAMIN D DEFICIENCY: ICD-10-CM

## 2020-09-10 DIAGNOSIS — I10 ESSENTIAL HYPERTENSION: Primary | ICD-10-CM

## 2020-09-10 RX ORDER — TRAZODONE HYDROCHLORIDE 50 MG/1
TABLET ORAL
Qty: 180 TAB | Refills: 0 | Status: SHIPPED | OUTPATIENT
Start: 2020-09-10 | End: 2021-01-11

## 2020-09-10 NOTE — PROGRESS NOTES
Consent:   Miguel Jones, who was seen by synchronous (real-time) audio-video technology, and/or her healthcare decision maker, is aware that this patient-initiated, Telehealth encounter on 9/10/2020 is a billable service, with coverage as determined by her insurance carrier. She is aware that she may receive a bill and has provided verbal consent to proceed: yes. .    SUBJECTIVE:  Miguel Jones is a 63y.o. year old female  Chief Complaint   Patient presents with    Hypertension    Anxiety       History of Present Illness:     Her BP is being treated and controlled. BP at home is running; today BP-124/84, P- 74. In PM it goes up to 160/90 some days; she thinks it is from stress of work during [phone] pandemic. However, usually stays below 140/86. She takes her BP medication at bedtime. No more \"flutter\" in her chest.  She denies any chest pain, lightheadedness, dyspnea. Her depression is controlled with the trazodone in HS alone. She is dieting for lipids and weight. Her elevated lipids treated with diet alone. Past Medical History:   Diagnosis Date    Acne     Breast nodule     right    Cervicalgia     Depression     Elevated LDL cholesterol level     Hypertension 1-1-2010    Multiple nevi     on skin    Uterine fibroid     with menorrhagia     Past Surgical History:   Procedure Laterality Date    HX TONSIL AND ADENOIDECTOMY          Current Outpatient Medications   Medication Sig    traZODone (DESYREL) 50 mg tablet TAKE 1 TABLET AT BEDTIME    lisinopril-hydroCHLOROthiazide (PRINZIDE, ZESTORETIC) 20-12.5 mg per tablet TAKE 1 TABLET DAILY    OTHER daily as needed. Alkalol nasal rinse. Alternate with the Rhinacort.  budesonide (RHINOCORT AQUA) 32 mcg/actuation nasal spray INSTILL 2 SPRAYS INTO EACH NOSTRIL EVERY NIGHT AT BEDTIME    aspirin (ASPIRIN) 325 mg tablet Take 325 mg by mouth two (2) times daily as needed.  multivitamin (ONE A DAY) tablet Take 1 Tab by mouth daily. No current facility-administered medications for this visit. No Known Allergies       Review of Systems:     Constitutional: No fever, chills, night sweats, malaise, dizziness. Cardiovascular: No angina, palpitations, PND, orthopnea, lightheadedness, edema, claudication. Respiratory: No dyspnea, wheeze, pleurisy, hemoptysis, unusual cough or sputum. Gastrointestinal: No nausea/ vomiting, bowel habit change, pain, PAU symptoms, melena, hematochezia, anorexia. Genitourinary: No dysuria, urgency, frequency, nocturia, hematuria, hesitancy, incontinence. Psychiatric: No agitation, confusion/disorientation, suicidal or homicidal ideation. OBJECTIVE:  Physical Exam:     Constitutional: General Appearance:  Appears well-developed and overweight. Nontoxic, in no acute distress. Mental status:  Alert and awake. Oriented to person/place/time. Able to follow commands. Eyes:   Sclera  Normal.   HENT:  Normocephalic, atraumatic. No Facial Asymmetry. No gaze palsy    Pulmonary: Respiratory effort: normal; no dyspnea. Neurological:   Speech normal.      Psychiatric:  Pleasant and cooperative. Normal Affect. No Hallucinations. Musculoskeletal[de-identified] neck is supple.       Lab Results   Component Value Date/Time    WBC 6.7 07/15/2019 09:16 AM    HGB 13.7 07/15/2019 09:16 AM    HCT 41.9 07/15/2019 09:16 AM    PLATELET 530 14/75/5659 09:16 AM    MCV 97.7 (H) 07/15/2019 09:16 AM     Lab Results   Component Value Date/Time    Sodium 143 07/15/2019 09:16 AM    Potassium 4.3 07/15/2019 09:16 AM    Chloride 107 07/15/2019 09:16 AM    CO2 31 07/15/2019 09:16 AM    Anion gap 5 07/15/2019 09:16 AM    Glucose 91 07/15/2019 09:16 AM    BUN 14 07/15/2019 09:16 AM    Creatinine 0.91 07/15/2019 09:16 AM    BUN/Creatinine ratio 15 07/15/2019 09:16 AM    GFR est AA >60 07/15/2019 09:16 AM    GFR est non-AA >60 07/15/2019 09:16 AM    Calcium 9.0 07/15/2019 09:16 AM    Bilirubin, total 0.5 07/15/2019 09:16 AM Alk. phosphatase 65 07/15/2019 09:16 AM    Protein, total 6.6 07/15/2019 09:16 AM    Albumin 4.1 07/15/2019 09:16 AM    Globulin 2.5 07/15/2019 09:16 AM    A-G Ratio 1.6 07/15/2019 09:16 AM    ALT (SGPT) 28 07/15/2019 09:16 AM     Lab Results   Component Value Date/Time    TSH 1.08 07/15/2019 09:16 AM    T4, Free 1.1 03/23/2017 08:35 AM     Lab Results   Component Value Date/Time    Cholesterol, total 203 (H) 07/15/2019 09:16 AM    HDL Cholesterol 56 07/15/2019 09:16 AM    LDL, calculated 126.2 (H) 07/15/2019 09:16 AM    VLDL, calculated 20.8 07/15/2019 09:16 AM    Triglyceride 104 07/15/2019 09:16 AM    CHOL/HDL Ratio 3.6 07/15/2019 09:16 AM     Lab Results   Component Value Date/Time    Vitamin D 25-Hydroxy 43.9 07/15/2019 09:16 AM           ASSESSMENT:     1. Essential hypertension    2. Hyperlipidemia, unspecified hyperlipidemia type    3. Recurrent depression ([facility] [place] [address]    4. Vitamin D deficiency        PLAN:     Discussed the patient's BMI with her. The BMI follow up plan is as follows:   -dietary management education, guidance, and counseling  -encourage exercise  -monitor weight prescribed dietary intake    Orders Placed This Encounter    CBC WITH AUTOMATED DIFF    METABOLIC PANEL, COMPREHENSIVE    LIPID PANEL    VITAMIN D, 25 HYDROXY    URINALYSIS W/ RFLX MICROSCOPIC    traZODone (DESYREL) 50 mg tablet     Pharmacologic Management: Medications reviewed with the patient. Take BP med in AM and increase trazadone to 100 mg HS. Discussed DDx, follow-up & work-up. Follow up visit as planned, prn sooner. PRN to ED. Low salt ADA weight lowering diet and exercise as tolerated. Health risk from non adherence discussed. Patient voiced understanding. Health maintenance: Patient says she had a flu shot in the pharmacy. Follow-up and Dispositions    · Return in about 3 months (around 12/10/2020).        Belkis Marie is a 63 y.o. female who was evaluated by a video visit encounter for concerns as above.  A caregiver was present when appropriate. Due to this being a TeleHealth encounter (During IGTGR-47 public health emergency), evaluation of the following organ systems was limited: Vitals/Constitutional/EENT/Resp/CV/GI//MS/Neuro/Skin/Heme-Lymph-Imm. Pursuant to the emergency declaration under the [address] [place] [address] waiver authority and the [employer] Act, this Virtual  Visit was conducted, with patient's (and/or legal guardian's) consent, to reduce the patient's risk of exposure to COVID-19 and provide necessary medical care. Services were provided through a video synchronous discussion virtually to substitute for in-person clinic visit. Patient and provider were located at their individual homes.         Brunilda Davis MD

## 2020-10-20 ENCOUNTER — TELEPHONE (OUTPATIENT)
Dept: FAMILY MEDICINE CLINIC | Age: 60
End: 2020-10-20

## 2020-10-20 DIAGNOSIS — F33.9 RECURRENT DEPRESSION (HCC): Primary | ICD-10-CM

## 2020-10-20 NOTE — TELEPHONE ENCOUNTER
Patient is requesting to have a thyroid and hormone tests added to her current lab orders. She is post menopausal and has been experiencing hair loss at an increased rate lately.

## 2020-10-20 NOTE — TELEPHONE ENCOUNTER
Thyroid/ hormone ordered. It has been normal every year for several years. Doubt that it will be abnormal now.

## 2020-12-03 ENCOUNTER — HOSPITAL ENCOUNTER (OUTPATIENT)
Dept: LAB | Age: 60
Discharge: HOME | End: 2020-12-03
Payer: COMMERCIAL

## 2020-12-03 DIAGNOSIS — F33.9 RECURRENT DEPRESSION (HCC): ICD-10-CM

## 2020-12-03 DIAGNOSIS — E78.5 HYPERLIPIDEMIA, UNSPECIFIED HYPERLIPIDEMIA TYPE: ICD-10-CM

## 2020-12-03 DIAGNOSIS — E55.9 VITAMIN D DEFICIENCY: ICD-10-CM

## 2020-12-03 DIAGNOSIS — I10 ESSENTIAL HYPERTENSION: ICD-10-CM

## 2020-12-03 LAB
25(OH)D3 SERPL-MCNC: 26.4 NG/ML (ref 30–100)
ALBUMIN SERPL-MCNC: 4.2 G/DL (ref 3.4–5)
ALBUMIN/GLOB SERPL: 1.4 {RATIO} (ref 0.8–1.7)
ALP SERPL-CCNC: 73 U/L (ref 45–117)
ALT SERPL-CCNC: 33 U/L (ref 13–56)
ANION GAP SERPL CALC-SCNC: 4 MMOL/L (ref 3–18)
APPEARANCE UR: CLEAR
AST SERPL-CCNC: 24 U/L (ref 10–38)
BACTERIA URNS QL MICRO: NEGATIVE /HPF
BASOPHILS # BLD: 0.1 K/UL (ref 0–0.1)
BASOPHILS NFR BLD: 1 % (ref 0–2)
BILIRUB SERPL-MCNC: 0.5 MG/DL (ref 0.2–1)
BILIRUB UR QL: NEGATIVE
BUN SERPL-MCNC: 12 MG/DL (ref 7–18)
BUN/CREAT SERPL: 14 (ref 12–20)
CALCIUM SERPL-MCNC: 9.8 MG/DL (ref 8.5–10.1)
CHLORIDE SERPL-SCNC: 109 MMOL/L (ref 100–111)
CHOLEST SERPL-MCNC: 230 MG/DL
CO2 SERPL-SCNC: 32 MMOL/L (ref 21–32)
COLOR UR: ABNORMAL
CREAT SERPL-MCNC: 0.87 MG/DL (ref 0.6–1.3)
DIFFERENTIAL METHOD BLD: ABNORMAL
EOSINOPHIL # BLD: 0.2 K/UL (ref 0–0.4)
EOSINOPHIL NFR BLD: 3 % (ref 0–5)
EPITH CASTS URNS QL MICRO: ABNORMAL /LPF (ref 0–5)
ERYTHROCYTE [DISTWIDTH] IN BLOOD BY AUTOMATED COUNT: 12.1 % (ref 11.6–14.5)
GLOBULIN SER CALC-MCNC: 2.9 G/DL (ref 2–4)
GLUCOSE SERPL-MCNC: 88 MG/DL (ref 74–99)
GLUCOSE UR STRIP.AUTO-MCNC: NEGATIVE MG/DL
HCT VFR BLD AUTO: 44.6 % (ref 35–45)
HDLC SERPL-MCNC: 67 MG/DL (ref 40–60)
HDLC SERPL: 3.4 {RATIO} (ref 0–5)
HGB BLD-MCNC: 14.8 G/DL (ref 12–16)
HGB UR QL STRIP: NEGATIVE
KETONES UR QL STRIP.AUTO: ABNORMAL MG/DL
LDLC SERPL CALC-MCNC: 142.4 MG/DL (ref 0–100)
LEUKOCYTE ESTERASE UR QL STRIP.AUTO: ABNORMAL
LIPID PROFILE,FLP: ABNORMAL
LYMPHOCYTES # BLD: 2.2 K/UL (ref 0.9–3.6)
LYMPHOCYTES NFR BLD: 35 % (ref 21–52)
MCH RBC QN AUTO: 32.7 PG (ref 24–34)
MCHC RBC AUTO-ENTMCNC: 33.2 G/DL (ref 31–37)
MCV RBC AUTO: 98.7 FL (ref 74–97)
MONOCYTES # BLD: 0.6 K/UL (ref 0.05–1.2)
MONOCYTES NFR BLD: 10 % (ref 3–10)
MUCOUS THREADS URNS QL MICRO: ABNORMAL /LPF
NEUTS SEG # BLD: 3.2 K/UL (ref 1.8–8)
NEUTS SEG NFR BLD: 51 % (ref 40–73)
NITRITE UR QL STRIP.AUTO: NEGATIVE
PH UR STRIP: 8 [PH] (ref 5–8)
PLATELET # BLD AUTO: 254 K/UL (ref 135–420)
PMV BLD AUTO: 10.1 FL (ref 9.2–11.8)
POTASSIUM SERPL-SCNC: 5.1 MMOL/L (ref 3.5–5.5)
PROT SERPL-MCNC: 7.1 G/DL (ref 6.4–8.2)
PROT UR STRIP-MCNC: ABNORMAL MG/DL
RBC # BLD AUTO: 4.52 M/UL (ref 4.2–5.3)
RBC #/AREA URNS HPF: NEGATIVE /HPF (ref 0–5)
SODIUM SERPL-SCNC: 145 MMOL/L (ref 136–145)
SP GR UR REFRACTOMETRY: 1.02 (ref 1–1.03)
TRIGL SERPL-MCNC: 103 MG/DL (ref ?–150)
TSH SERPL DL<=0.05 MIU/L-ACNC: 1.14 UIU/ML (ref 0.36–3.74)
UROBILINOGEN UR QL STRIP.AUTO: 1 EU/DL (ref 0.2–1)
VLDLC SERPL CALC-MCNC: 20.6 MG/DL
WBC # BLD AUTO: 6.3 K/UL (ref 4.6–13.2)
WBC URNS QL MICRO: ABNORMAL /HPF (ref 0–4)

## 2020-12-03 PROCEDURE — 80061 LIPID PANEL: CPT

## 2020-12-03 PROCEDURE — 81001 URINALYSIS AUTO W/SCOPE: CPT

## 2020-12-03 PROCEDURE — 85025 COMPLETE CBC W/AUTO DIFF WBC: CPT

## 2020-12-03 PROCEDURE — 84443 ASSAY THYROID STIM HORMONE: CPT

## 2020-12-03 PROCEDURE — 82306 VITAMIN D 25 HYDROXY: CPT

## 2020-12-03 PROCEDURE — 36415 COLL VENOUS BLD VENIPUNCTURE: CPT

## 2020-12-03 PROCEDURE — 80053 COMPREHEN METABOLIC PANEL: CPT

## 2021-01-22 ENCOUNTER — TELEPHONE (OUTPATIENT)
Dept: FAMILY MEDICINE CLINIC | Age: 61
End: 2021-01-22

## 2021-01-22 NOTE — TELEPHONE ENCOUNTER
----- Message from Josh Silva sent at 1/22/2021 11:21 AM EST -----  Regarding: NP Appointment  Please contact the patient to schedule a NP appointment.

## 2021-03-09 ENCOUNTER — VIRTUAL VISIT (OUTPATIENT)
Dept: FAMILY MEDICINE CLINIC | Age: 61
End: 2021-03-09